# Patient Record
Sex: FEMALE | Race: WHITE | NOT HISPANIC OR LATINO | URBAN - METROPOLITAN AREA
[De-identification: names, ages, dates, MRNs, and addresses within clinical notes are randomized per-mention and may not be internally consistent; named-entity substitution may affect disease eponyms.]

---

## 2018-08-21 ENCOUNTER — HISTORICAL (OUTPATIENT)
Dept: ADMINISTRATIVE | Facility: HOSPITAL | Age: 58
End: 2018-08-21

## 2018-08-21 LAB
ABS NEUT (OLG): 5
ALBUMIN SERPL-MCNC: 4.4 GM/DL (ref 3.4–5)
ALBUMIN/GLOB SERPL: 1.47 {RATIO} (ref 1.5–2.5)
ALP SERPL-CCNC: 55 UNIT/L (ref 38–126)
ALT SERPL-CCNC: 54 UNIT/L (ref 7–52)
APPEARANCE, UA: CLEAR
AST SERPL-CCNC: 27 UNIT/L (ref 15–37)
BACTERIA #/AREA URNS AUTO: ABNORMAL /HPF
BILIRUB SERPL-MCNC: 0.3 MG/DL (ref 0.2–1)
BILIRUB UR QL STRIP: NEGATIVE MG/DL
BILIRUBIN DIRECT+TOT PNL SERPL-MCNC: 0.1 MG/DL (ref 0–0.5)
BILIRUBIN DIRECT+TOT PNL SERPL-MCNC: 0.2 MG/DL
BUN SERPL-MCNC: 23 MG/DL (ref 7–18)
CALCIUM SERPL-MCNC: 8.8 MG/DL (ref 8.5–10)
CHLORIDE SERPL-SCNC: 106 MMOL/L (ref 98–107)
CHOLEST SERPL-MCNC: 123 MG/DL (ref 0–200)
CHOLEST/HDLC SERPL: 2.9 {RATIO}
CK SERPL-CCNC: 66 UNIT/L (ref 21–232)
CO2 SERPL-SCNC: 28 MMOL/L (ref 21–32)
COLOR UR: YELLOW
CREAT SERPL-MCNC: 0.83 MG/DL (ref 0.6–1.3)
CREAT UR-MCNC: 300 MG/DL
ERYTHROCYTE [DISTWIDTH] IN BLOOD BY AUTOMATED COUNT: 14.7 % (ref 11.5–17)
EST. AVERAGE GLUCOSE BLD GHB EST-MCNC: 131 MG/DL
GLOBULIN SER-MCNC: 3 GM/DL (ref 1.2–3)
GLUCOSE (UA): NEGATIVE MG/DL
GLUCOSE SERPL-MCNC: 122 MG/DL (ref 74–106)
HBA1C MFR BLD: 6.2 % (ref 4.4–6.4)
HCT VFR BLD AUTO: 38.2 % (ref 37–47)
HDLC SERPL-MCNC: 42 MG/DL (ref 35–60)
HGB BLD-MCNC: 12.4 GM/DL (ref 12–16)
HGB UR QL STRIP: NEGATIVE UNIT/L
KETONES UR QL STRIP: NEGATIVE MG/DL
LDLC SERPL CALC-MCNC: 64 MG/DL (ref 0–129)
LEUKOCYTE ESTERASE UR QL STRIP: NEGATIVE UNIT/L
LYMPHOCYTES # BLD AUTO: 2 X10(3)/MCL (ref 0.6–3.4)
LYMPHOCYTES NFR BLD AUTO: 25.9 % (ref 13–40)
MCH RBC QN AUTO: 32.1 PG (ref 27–31.2)
MCHC RBC AUTO-ENTMCNC: 32 GM/DL (ref 32–36)
MCV RBC AUTO: 99 FL (ref 80–94)
MICROALBUMIN UR-MCNC: 30 MG/L
MICROALBUMIN/CREAT RATIO PNL UR: <30 MG/GM
MONOCYTES # BLD AUTO: 0.7 X10(3)/MCL (ref 0–1.8)
MONOCYTES NFR BLD AUTO: 8.6 % (ref 0.1–24)
NEUTROPHILS NFR BLD AUTO: 65.5 % (ref 47–80)
NITRITE UR QL STRIP.AUTO: NEGATIVE
PH UR STRIP: 5.5 [PH]
PLATELET # BLD AUTO: 408 X10(3)/MCL (ref 130–400)
PMV BLD AUTO: 8.3 FL
POTASSIUM SERPL-SCNC: 4.6 MMOL/L (ref 3.5–5.1)
PROT SERPL-MCNC: 7.4 GM/DL (ref 6.4–8.2)
PROT UR QL STRIP: NEGATIVE MG/DL
RBC # BLD AUTO: 3.86 X10(6)/MCL (ref 4.2–5.4)
RBC #/AREA URNS HPF: ABNORMAL /HPF
SODIUM SERPL-SCNC: 139 MMOL/L (ref 136–145)
SP GR UR STRIP: >1.03
SQUAMOUS EPITHELIAL, UA: ABNORMAL /LPF
TRIGL SERPL-MCNC: 114 MG/DL (ref 30–150)
TSH SERPL-ACNC: 1.97 MIU/ML (ref 0.35–4.94)
UROBILINOGEN UR STRIP-ACNC: 0.2 MG/DL
VLDLC SERPL CALC-MCNC: 22.8 MG/DL
WBC # SPEC AUTO: 7.7 X10(3)/MCL (ref 4.5–11.5)
WBC #/AREA URNS AUTO: ABNORMAL /[HPF]

## 2019-03-12 ENCOUNTER — HISTORICAL (OUTPATIENT)
Dept: ADMINISTRATIVE | Facility: HOSPITAL | Age: 59
End: 2019-03-12

## 2019-03-12 LAB
APPEARANCE, UA: CLEAR
BACTERIA #/AREA URNS AUTO: NORMAL /HPF
BILIRUB UR QL STRIP: NEGATIVE MG/DL
BUN SERPL-MCNC: 19 MG/DL (ref 7–18)
CALCIUM SERPL-MCNC: 9.2 MG/DL (ref 8.5–10)
CHLORIDE SERPL-SCNC: 104 MMOL/L (ref 98–107)
CO2 SERPL-SCNC: 28 MMOL/L (ref 21–32)
COLOR UR: YELLOW
CREAT SERPL-MCNC: 0.95 MG/DL (ref 0.6–1.3)
CREAT/UREA NIT SERPL: 20
EST. AVERAGE GLUCOSE BLD GHB EST-MCNC: 126 MG/DL
GLUCOSE (UA): NEGATIVE MG/DL
GLUCOSE SERPL-MCNC: 98 MG/DL (ref 74–106)
HBA1C MFR BLD: 6 % (ref 4.4–6.4)
HGB UR QL STRIP: NEGATIVE UNIT/L
KETONES UR QL STRIP: NEGATIVE MG/DL
LEUKOCYTE ESTERASE UR QL STRIP: NEGATIVE UNIT/L
NITRITE UR QL STRIP.AUTO: NEGATIVE
PH UR STRIP: 5 [PH]
POTASSIUM SERPL-SCNC: 4.2 MMOL/L (ref 3.5–5.1)
PROT UR QL STRIP: NEGATIVE MG/DL
RBC #/AREA URNS HPF: NORMAL /HPF
SODIUM SERPL-SCNC: 139 MMOL/L (ref 136–145)
SP GR UR STRIP: 1.01
SQUAMOUS EPITHELIAL, UA: NORMAL /LPF
UROBILINOGEN UR STRIP-ACNC: 0.2 MG/DL
WBC #/AREA URNS AUTO: NORMAL /[HPF]

## 2019-09-11 ENCOUNTER — HISTORICAL (OUTPATIENT)
Dept: ADMINISTRATIVE | Facility: HOSPITAL | Age: 59
End: 2019-09-11

## 2019-09-11 LAB
ABS NEUT (OLG): 7.3 X10(3)/MCL (ref 2.1–9.2)
ALBUMIN SERPL-MCNC: 4.5 GM/DL (ref 3.4–5)
ALBUMIN/GLOB SERPL: 2.05 {RATIO} (ref 1.5–2.5)
ALP SERPL-CCNC: 46 UNIT/L (ref 38–126)
ALT SERPL-CCNC: 29 UNIT/L (ref 7–52)
APPEARANCE, UA: CLEAR
AST SERPL-CCNC: 18 UNIT/L (ref 15–37)
BACTERIA #/AREA URNS AUTO: ABNORMAL /HPF
BILIRUB SERPL-MCNC: 0.3 MG/DL (ref 0.2–1)
BILIRUB UR QL STRIP: NEGATIVE MG/DL
BILIRUBIN DIRECT+TOT PNL SERPL-MCNC: 0 MG/DL (ref 0–0.5)
BILIRUBIN DIRECT+TOT PNL SERPL-MCNC: 0.3 MG/DL
BUN SERPL-MCNC: 26 MG/DL (ref 7–18)
CALCIUM SERPL-MCNC: 9.2 MG/DL (ref 8.5–10)
CHLORIDE SERPL-SCNC: 103 MMOL/L (ref 98–107)
CHOLEST SERPL-MCNC: 164 MG/DL (ref 0–200)
CHOLEST/HDLC SERPL: 3.5 {RATIO}
CK SERPL-CCNC: 51 UNIT/L (ref 21–232)
CO2 SERPL-SCNC: 29 MMOL/L (ref 21–32)
COLOR UR: YELLOW
CREAT SERPL-MCNC: 0.98 MG/DL (ref 0.6–1.3)
CREAT UR-MCNC: 300 MG/DL
ERYTHROCYTE [DISTWIDTH] IN BLOOD BY AUTOMATED COUNT: 14.8 % (ref 11.5–17)
EST. AVERAGE GLUCOSE BLD GHB EST-MCNC: 123 MG/DL
GLOBULIN SER-MCNC: 2.2 GM/DL (ref 1.2–3)
GLUCOSE (UA): NEGATIVE MG/DL
GLUCOSE SERPL-MCNC: 111 MG/DL (ref 74–106)
HBA1C MFR BLD: 5.9 % (ref 4.4–6.4)
HCT VFR BLD AUTO: 39.1 % (ref 37–47)
HDLC SERPL-MCNC: 47 MG/DL (ref 35–60)
HGB BLD-MCNC: 12.8 GM/DL (ref 12–16)
HGB UR QL STRIP: NEGATIVE UNIT/L
KETONES UR QL STRIP: NEGATIVE MG/DL
LDLC SERPL CALC-MCNC: 87 MG/DL (ref 0–129)
LEUKOCYTE ESTERASE UR QL STRIP: ABNORMAL UNIT/L
LYMPHOCYTES # BLD AUTO: 3.6 X10(3)/MCL (ref 0.6–3.4)
LYMPHOCYTES NFR BLD AUTO: 31.3 % (ref 13–40)
MCH RBC QN AUTO: 31.2 PG (ref 27–31.2)
MCHC RBC AUTO-ENTMCNC: 33 GM/DL (ref 32–36)
MCV RBC AUTO: 95 FL (ref 80–94)
MICROALBUMIN UR-MCNC: 80 MG/L
MICROALBUMIN/CREAT RATIO PNL UR: <30 MG/GM
MONOCYTES # BLD AUTO: 0.7 X10(3)/MCL (ref 0.1–1.3)
MONOCYTES NFR BLD AUTO: 6.4 % (ref 0.1–24)
NEUTROPHILS NFR BLD AUTO: 62.3 % (ref 47–80)
NITRITE UR QL STRIP.AUTO: NEGATIVE
PH UR STRIP: 6 [PH]
PLATELET # BLD AUTO: 471 X10(3)/MCL (ref 130–400)
PMV BLD AUTO: 8.2 FL (ref 9.4–12.4)
POTASSIUM SERPL-SCNC: 4.6 MMOL/L (ref 3.5–5.1)
PROT SERPL-MCNC: 6.7 GM/DL (ref 6.4–8.2)
PROT UR QL STRIP: NEGATIVE MG/DL
RBC # BLD AUTO: 4.1 X10(6)/MCL (ref 4.2–5.4)
RBC #/AREA URNS HPF: ABNORMAL /HPF
SODIUM SERPL-SCNC: 140 MMOL/L (ref 136–145)
SP GR UR STRIP: 1.02
SQUAMOUS EPITHELIAL, UA: ABNORMAL /LPF
TRIGL SERPL-MCNC: 199 MG/DL (ref 30–150)
TSH SERPL-ACNC: 2.34 MIU/ML (ref 0.35–4.94)
UROBILINOGEN UR STRIP-ACNC: 0.2 MG/DL
VLDLC SERPL CALC-MCNC: 39.8 MG/DL
WBC # SPEC AUTO: 11.6 X10(3)/MCL (ref 4.5–11.5)
WBC #/AREA URNS AUTO: ABNORMAL /[HPF]

## 2020-03-10 ENCOUNTER — HISTORICAL (OUTPATIENT)
Dept: ADMINISTRATIVE | Facility: HOSPITAL | Age: 60
End: 2020-03-10

## 2020-03-10 LAB
ALBUMIN SERPL-MCNC: 4.8 GM/DL (ref 3.4–5)
ALBUMIN/GLOB SERPL: 2.09 {RATIO} (ref 1.5–2.5)
ALP SERPL-CCNC: 54 UNIT/L (ref 38–126)
ALT SERPL-CCNC: 34 UNIT/L (ref 7–52)
AST SERPL-CCNC: 24 UNIT/L (ref 15–37)
BILIRUB SERPL-MCNC: 0.3 MG/DL (ref 0.2–1)
BILIRUBIN DIRECT+TOT PNL SERPL-MCNC: 0.1 MG/DL (ref 0–0.5)
BILIRUBIN DIRECT+TOT PNL SERPL-MCNC: 0.2 MG/DL
BUN SERPL-MCNC: 17 MG/DL (ref 7–18)
CALCIUM SERPL-MCNC: 9.4 MG/DL (ref 8.5–10)
CHLORIDE SERPL-SCNC: 102 MMOL/L (ref 98–107)
CO2 SERPL-SCNC: 31 MMOL/L (ref 21–32)
CREAT SERPL-MCNC: 0.85 MG/DL (ref 0.6–1.3)
EST. AVERAGE GLUCOSE BLD GHB EST-MCNC: 126 MG/DL
GLOBULIN SER-MCNC: 2.3 GM/DL (ref 1.2–3)
GLUCOSE SERPL-MCNC: 111 MG/DL (ref 74–106)
HBA1C MFR BLD: 6 % (ref 4.4–6.4)
POTASSIUM SERPL-SCNC: 4.6 MMOL/L (ref 3.5–5.1)
PROT SERPL-MCNC: 7.1 GM/DL (ref 6.4–8.2)
SODIUM SERPL-SCNC: 139 MMOL/L (ref 136–145)

## 2020-09-17 ENCOUNTER — HISTORICAL (OUTPATIENT)
Dept: ADMINISTRATIVE | Facility: HOSPITAL | Age: 60
End: 2020-09-17

## 2020-09-17 LAB
ABS NEUT (OLG): 5.7 X10(3)/MCL (ref 2.1–9.2)
ALBUMIN SERPL-MCNC: 4.6 GM/DL (ref 3.4–5)
ALBUMIN/GLOB SERPL: 1.92 {RATIO} (ref 1.5–2.5)
ALP SERPL-CCNC: 52 UNIT/L (ref 38–126)
ALT SERPL-CCNC: 24 UNIT/L (ref 7–52)
APPEARANCE, UA: ABNORMAL
AST SERPL-CCNC: 19 UNIT/L (ref 15–37)
BACTERIA #/AREA URNS AUTO: ABNORMAL /HPF
BILIRUB SERPL-MCNC: 0.3 MG/DL (ref 0.2–1)
BILIRUB UR QL STRIP: NEGATIVE MG/DL
BILIRUBIN DIRECT+TOT PNL SERPL-MCNC: 0.1 MG/DL (ref 0–0.5)
BILIRUBIN DIRECT+TOT PNL SERPL-MCNC: 0.2 MG/DL
BUN SERPL-MCNC: 21 MG/DL (ref 7–18)
CALCIUM SERPL-MCNC: 9.4 MG/DL (ref 8.5–10)
CHLORIDE SERPL-SCNC: 105 MMOL/L (ref 98–107)
CHOLEST SERPL-MCNC: 147 MG/DL (ref 0–200)
CHOLEST/HDLC SERPL: 3 {RATIO}
CK SERPL-CCNC: 68 UNIT/L (ref 21–232)
CO2 SERPL-SCNC: 26 MMOL/L (ref 21–32)
COLOR UR: YELLOW
CREAT SERPL-MCNC: 0.8 MG/DL (ref 0.6–1.3)
CREAT UR-MCNC: 50 MG/DL
ERYTHROCYTE [DISTWIDTH] IN BLOOD BY AUTOMATED COUNT: 15.4 % (ref 11.5–17)
EST. AVERAGE GLUCOSE BLD GHB EST-MCNC: 111 MG/DL
GLOBULIN SER-MCNC: 2.4 GM/DL (ref 1.2–3)
GLUCOSE (UA): NEGATIVE MG/DL
GLUCOSE SERPL-MCNC: 105 MG/DL (ref 74–106)
HBA1C MFR BLD: 5.5 % (ref 4.4–6.4)
HCT VFR BLD AUTO: 38.2 % (ref 37–47)
HDLC SERPL-MCNC: 49 MG/DL (ref 35–60)
HGB BLD-MCNC: 12.5 GM/DL (ref 12–16)
HGB UR QL STRIP: NEGATIVE UNIT/L
KETONES UR QL STRIP: NEGATIVE MG/DL
LDLC SERPL CALC-MCNC: 89 MG/DL (ref 0–129)
LEUKOCYTE ESTERASE UR QL STRIP: ABNORMAL UNIT/L
LYMPHOCYTES # BLD AUTO: 1.9 X10(3)/MCL (ref 0.6–3.4)
LYMPHOCYTES NFR BLD AUTO: 22.9 % (ref 13–40)
MCH RBC QN AUTO: 31.6 PG (ref 27–31.2)
MCHC RBC AUTO-ENTMCNC: 33 GM/DL (ref 32–36)
MCV RBC AUTO: 96 FL (ref 80–94)
MICROALBUMIN UR-MCNC: 10 MG/L
MICROALBUMIN/CREAT RATIO PNL UR: <30 MG/GM
MONOCYTES # BLD AUTO: 0.6 X10(3)/MCL (ref 0.1–1.3)
MONOCYTES NFR BLD AUTO: 7.9 % (ref 0.1–24)
NEUTROPHILS NFR BLD AUTO: 69.2 % (ref 47–80)
NITRITE UR QL STRIP.AUTO: NEGATIVE
PH UR STRIP: 5.5 [PH]
PLATELET # BLD AUTO: 482 X10(3)/MCL (ref 130–400)
PMV BLD AUTO: 8.3 FL (ref 9.4–12.4)
POTASSIUM SERPL-SCNC: 4.4 MMOL/L (ref 3.5–5.1)
PROT SERPL-MCNC: 7 GM/DL (ref 6.4–8.2)
PROT UR QL STRIP: NEGATIVE MG/DL
RBC # BLD AUTO: 3.96 X10(6)/MCL (ref 4.2–5.4)
RBC #/AREA URNS HPF: ABNORMAL /HPF
SODIUM SERPL-SCNC: 142 MMOL/L (ref 136–145)
SP GR UR STRIP: 1.02
SQUAMOUS EPITHELIAL, UA: ABNORMAL /LPF
TRIGL SERPL-MCNC: 99 MG/DL (ref 30–150)
TSH SERPL-ACNC: 1.61 MIU/ML (ref 0.35–4.94)
UROBILINOGEN UR STRIP-ACNC: 0.2 MG/DL
VLDLC SERPL CALC-MCNC: 19.8 MG/DL
WBC # SPEC AUTO: 8.2 X10(3)/MCL (ref 4.5–11.5)
WBC #/AREA URNS AUTO: ABNORMAL /[HPF]

## 2021-03-18 ENCOUNTER — HISTORICAL (OUTPATIENT)
Dept: ADMINISTRATIVE | Facility: HOSPITAL | Age: 61
End: 2021-03-18

## 2021-03-18 LAB
BUN SERPL-MCNC: 19 MG/DL (ref 7–18)
CALCIUM SERPL-MCNC: 9.8 MG/DL (ref 8.5–10)
CHLORIDE SERPL-SCNC: 105 MMOL/L (ref 98–107)
CO2 SERPL-SCNC: 27 MMOL/L (ref 21–32)
CREAT SERPL-MCNC: 0.82 MG/DL (ref 0.6–1.3)
CREAT/UREA NIT SERPL: 23.2
EST. AVERAGE GLUCOSE BLD GHB EST-MCNC: 108 MG/DL
GLUCOSE SERPL-MCNC: 111 MG/DL (ref 74–106)
HBA1C MFR BLD: 5.4 % (ref 4.4–6.4)
POTASSIUM SERPL-SCNC: 4.8 MMOL/L (ref 3.5–5.1)
SODIUM SERPL-SCNC: 139 MMOL/L (ref 136–145)

## 2021-06-16 ENCOUNTER — HISTORICAL (OUTPATIENT)
Dept: ADMINISTRATIVE | Facility: HOSPITAL | Age: 61
End: 2021-06-16

## 2021-09-23 ENCOUNTER — HISTORICAL (OUTPATIENT)
Dept: ADMINISTRATIVE | Facility: HOSPITAL | Age: 61
End: 2021-09-23

## 2021-09-23 LAB
ABS NEUT (OLG): 7.5 X10(3)/MCL (ref 2.1–9.2)
ALBUMIN SERPL-MCNC: 4.4 GM/DL (ref 3.4–5)
ALBUMIN/GLOB SERPL: 2 {RATIO} (ref 1.5–2.5)
ALP SERPL-CCNC: 37 UNIT/L (ref 38–126)
ALT SERPL-CCNC: 23 UNIT/L (ref 7–52)
APPEARANCE, UA: CLEAR
AST SERPL-CCNC: 17 UNIT/L (ref 15–37)
BACTERIA #/AREA URNS AUTO: ABNORMAL /HPF
BILIRUB SERPL-MCNC: 0.4 MG/DL (ref 0.2–1)
BILIRUB UR QL STRIP: NEGATIVE MG/DL
BILIRUBIN DIRECT+TOT PNL SERPL-MCNC: 0.1 MG/DL (ref 0–0.5)
BILIRUBIN DIRECT+TOT PNL SERPL-MCNC: 0.3 MG/DL
BUN SERPL-MCNC: 19 MG/DL (ref 7–18)
CALCIUM SERPL-MCNC: 9.2 MG/DL (ref 8.5–10)
CHLORIDE SERPL-SCNC: 103 MMOL/L (ref 98–107)
CHOLEST SERPL-MCNC: 158 MG/DL (ref 0–200)
CHOLEST/HDLC SERPL: 2.7 {RATIO}
CK SERPL-CCNC: 65 UNIT/L (ref 21–232)
CO2 SERPL-SCNC: 28 MMOL/L (ref 21–32)
COLOR UR: YELLOW
CREAT SERPL-MCNC: 0.8 MG/DL (ref 0.6–1.3)
CREAT UR-MCNC: 200 MG/DL
ERYTHROCYTE [DISTWIDTH] IN BLOOD BY AUTOMATED COUNT: 14.9 % (ref 11.5–17)
EST CREAT CLEARANCE SER (OHS): 81.46 ML/MIN
EST. AVERAGE GLUCOSE BLD GHB EST-MCNC: 117 MG/DL
GLOBULIN SER-MCNC: 2.2 GM/DL (ref 1.2–3)
GLUCOSE (UA): NEGATIVE MG/DL
GLUCOSE SERPL-MCNC: 110 MG/DL (ref 74–106)
HBA1C MFR BLD: 5.7 % (ref 4.4–6.4)
HCT VFR BLD AUTO: 36.8 % (ref 37–47)
HDLC SERPL-MCNC: 59 MG/DL (ref 35–60)
HGB BLD-MCNC: 12 GM/DL (ref 12–16)
HGB UR QL STRIP: NEGATIVE UNIT/L
KETONES UR QL STRIP: NEGATIVE MG/DL
LDLC SERPL CALC-MCNC: 78 MG/DL (ref 0–129)
LEUKOCYTE ESTERASE UR QL STRIP: NEGATIVE UNIT/L
LYMPHOCYTES # BLD AUTO: 1.2 X10(3)/MCL (ref 0.6–3.4)
LYMPHOCYTES NFR BLD AUTO: 12.4 % (ref 13–40)
MCH RBC QN AUTO: 32.1 PG (ref 27–31.2)
MCHC RBC AUTO-ENTMCNC: 33 GM/DL (ref 32–36)
MCV RBC AUTO: 98 FL (ref 80–94)
MICROALBUMIN UR-MCNC: 10 MG/L
MICROALBUMIN/CREAT RATIO PNL UR: <30 MG/GM
MONOCYTES # BLD AUTO: 0.7 X10(3)/MCL (ref 0.1–1.3)
MONOCYTES NFR BLD AUTO: 7.4 % (ref 0.1–24)
NEUTROPHILS NFR BLD AUTO: 80.2 % (ref 47–80)
NITRITE UR QL STRIP.AUTO: NEGATIVE
PH UR STRIP: 5.5 [PH]
PLATELET # BLD AUTO: 490 X10(3)/MCL (ref 130–400)
PMV BLD AUTO: 8 FL (ref 9.4–12.4)
POTASSIUM SERPL-SCNC: 4.3 MMOL/L (ref 3.5–5.1)
PROT SERPL-MCNC: 6.6 GM/DL (ref 6.4–8.2)
PROT UR QL STRIP: NEGATIVE MG/DL
RBC # BLD AUTO: 3.74 X10(6)/MCL (ref 4.2–5.4)
RBC #/AREA URNS HPF: ABNORMAL /HPF
SODIUM SERPL-SCNC: 140 MMOL/L (ref 136–145)
SP GR UR STRIP: >1.03
SQUAMOUS EPITHELIAL, UA: ABNORMAL /LPF
TRIGL SERPL-MCNC: 136 MG/DL (ref 30–150)
TSH SERPL-ACNC: 1.13 MIU/ML (ref 0.35–4.94)
UROBILINOGEN UR STRIP-ACNC: 0.2 MG/DL
VLDLC SERPL CALC-MCNC: 27.2 MG/DL
WBC # SPEC AUTO: 9.4 X10(3)/MCL (ref 4.5–11.5)
WBC #/AREA URNS AUTO: ABNORMAL /[HPF]

## 2022-04-10 ENCOUNTER — HISTORICAL (OUTPATIENT)
Dept: ADMINISTRATIVE | Facility: HOSPITAL | Age: 62
End: 2022-04-10

## 2022-04-29 VITALS
OXYGEN SATURATION: 97 % | DIASTOLIC BLOOD PRESSURE: 72 MMHG | HEIGHT: 61 IN | BODY MASS INDEX: 28.72 KG/M2 | SYSTOLIC BLOOD PRESSURE: 122 MMHG | WEIGHT: 151 LBS | SYSTOLIC BLOOD PRESSURE: 116 MMHG | WEIGHT: 152.13 LBS | OXYGEN SATURATION: 97 % | DIASTOLIC BLOOD PRESSURE: 76 MMHG | BODY MASS INDEX: 28.51 KG/M2 | HEIGHT: 61 IN

## 2022-05-02 NOTE — HISTORICAL [facility] CERNER
This is a historical note converted from [employer]. Formatting and pictures may have been removed.  Please reference Pre[employer] for original formatting and attached multimedia. Chief Complaint  wellness  History of Present Illness  The patient is a?59 year old?female here today for a complete Wellness Physical exam. The patient has?no acute complaints today. The patient also carries a diagnosis of mentioned, which is assessed today. Exercise is reported as?moderate and includes?working out at the?gym 3- 5 times a week with weights and?45 min of cardio?Monitors diet on a daily basis. Previous documented weight at last office visit is 166- congratulated on weight loss!?  Also here in clinic for evaluation of T2DM. The patient reports home blood glucose levels of?admits to not monitoring unless she is taking steroids. ?The patient reports no hypoglycemic episodes. The patient reports 100% compliance with medications. The patient reports no side effects with current medication use. The patient reports following a diabetic diet. The patient reports some cardiovascular exercise implementation. There is no chest pain or shortness of breath reported with exercise.?  ?   Dr. Rodriguez- GYN  Dr. Tran (rheumatology)  Dr. Sahni (pulmonary)  Dr. Rivers (ophthalmologist)  Review of Systems  Constitutional:?no weight gain,?weight loss,?no fatigue,?no fever,?no chills,?no weakness,?no trouble sleeping.  Eyes:?no vision loss/changes,?glasses or contacts,?no pain,?no redness,?no blurry or double vision,?no flashing lights,?no specks,?no glaucoma,?no cataracts.  Last eye exam:?within last 6 months  Head:?no headache,?no head injury,?no neck pain.?  Neck:??no lumps,?no swollen glands,?no stiffness.  Ears:?no decreased hearing,?no ringing,?no earache,?no drainage.?  Nose:?no stuffiness,?no discharge,?no itching,?no hay fever,?no nosebleeds,?no sinus pain.  Throat:?no bleeding,?no dentures,?no sore tongue,?no dry mouth,?no sore throat,?no  hoarseness,?no thrush,?no non-healing sores.  Cardiovascular:?no chest pain or discomfort,?no tightness,?no palpitations,?no SOB with activity,?no difficulty breathing while supine,?no swelling,?no sudden awakening from sleep with SOB.  Vascular:?no calf pain with walking,?no leg cramping.  Respiratory:??no cough,?no sputum,?no coughing up blood,?no SOB,?no wheezing,?no painful breathing.  Gastrointestinal:?no swallowing difficulty,?no heartburn,?no change in appetite,?no nausea,?no change in bowel habits,?no rectal bleeding,?no constipation,?no diarrhea,?no yellow eyes or skin.  Urinary:?no frequency,?no urgency,?no burning or pain,?no blood in urine,?no incontinence,?no change in urinary strength.  Musculoskeletal:?no muscle or joint pain,?no stiffness,?no back pain,?no redness of joints,?no swelling of joints,?no trauma.  Skin:?no rashes,?no lumps,?no itching,?no dryness,?color normal for ethnicity,?no hair or nail changes.  Neurologic:?no dizziness,?no fainting,?no seizures,?no weakness,?no numbness,?no tingling,?no tremors.  Psychiatric:?no nervousness,?no stress,?no depression,?no memory loss.  Endocrine:?no heat or cold intolerance,?no sweating,?no frequent urination,?no thirst,?no change in appetite.  Hematologic:?no ease of bruising,?no bleeding  Physical Exam  Vitals & Measurements  BP:?118/78?  HT:?154.00?cm? WT:?66.800?kg? BMI:?28.17?  General- In NAD, A&O x 4  ?   Eye- PERRL, EOMI  ?   HENT- TM/EAC clear, Nose mucosa WNL, No D/C, No Sinus Tenderness, O/P without erythema or exudates?  ?   Neck- S, No LA, No Thyromegaly, No bruits, No JVD  ?   Respiratory- CTA, No wheezing, No crackles, No rhonchi  ?   Cardiovascular- RRR W/O MGR, Pulses equal throughout  ?   Gastrointestinal- S, NT, No HSM, NABS, No masses, No peritoneal signs?  ?   Lymphatics- WNL  ?  Musculoskeletal- No tenderness, Joints WNL, FROM, Neg SLR, No CCE  ?  Integumentary- Warm, dry, intact, No lesions/rashes/hives  ?  Neurologic- No  Motor/Sensory deficits, Reflexes +2 throughout, CN II-XII intact, Neg cerebellar tests  ?  Monofilament-?WNL,+2 pulses,?no lesions noted  ?  Assessment/Plan  1.?Wellness examination?Z00.00  ?1. Wellness  - Health and Exercise educated upon  -10% weight loss goal  -Lifestyle counseling > 20 minutes  -Diet: DM friendly  -Screening: UTD Colon, Mammogram  -Vaccines: UTD Tdap/Prevnar/Shingrix- wishes flu shot today however they have not come in- she will inquire at her pharmacy  -LabS: CBC, CMP, LIPIDS, TSH, UA, MA, A1C, CK- to fax lab work to all providers  ?   2. Comorbidities- mentioned  ?   3. Referrals none at this time  ?   4. RTC in?6 month sooner if needed  Ordered:  Comprehensive Metabolic Panel, Routine collect, 09/17/20 9:24:00 CDT, Blood, Stop date 09/17/20 9:24:00 CDT, Lab Collect, Wellness examination  Diabetes mellitus type 2  Hyperlipidaemia, 09/17/20 9:24:00 CDT  Lipid Panel, Routine collect, 09/17/20 9:24:00 CDT, Blood, Stop date 09/17/20 9:24:00 CDT, Lab Collect, Wellness examination  Hyperlipidaemia, 09/17/20 9:24:00 CDT  Preventative Health Care Est 40-64 years [id number] PC, Wellness examination, [facility]K [facility]B - [facility], 09/17/20 9:18:00 CDT  Request Mammography Results, 09/17/20 10:30:00 CDT, Wellness examination  Thyroid Stimulating Hormone, Routine collect, 09/17/20 9:24:00 CDT, Blood, Stop date 09/17/20 9:24:00 CDT, Lab Collect, Hypothyroid  Wellness examination, 09/17/20 9:24:00 CDT  ?  2.?Diabetes mellitus type 2?E11.9  1. Refill meds x 6 months  2. Continue diet modifications- decrease sugar, carb, starch intake, exercise as tolerated (TLC) for weight loss  3.?Encouraged importance of yearly eye exams  4. Follow up office visit 6 months for Wellness visit  Ordered:  aspirin, 81 mg = 1 tab(s), Oral, Daily, # 30 tab(s), 0 Refill(s), other reason (Rx), 154, cm, Height/Length Dosing, 09/17/20 8:36:00 CDT, 66.8, kg, Weight Dosing, 09/17/20 8:36:00 CDT  metFORMIN, 500 mg = 1 tab(s), Oral, BID, # 180 tab(s),  1 Refill(s), Pharmacy: [id number], 154, cm, Height/Length Dosing, 09/17/20 8:36:00 CDT, 66.8, kg, Weight Dosing, 09/17/20 8:36:00 CDT  Clinic Follow up, *Est. 03/18/21 8:15:00 CDT, Order for future visit, Diabetes mellitus type 2, [facility]  Comprehensive Metabolic Panel, Routine collect, 09/17/20 9:24:00 CDT, Blood, Stop date 09/17/20 9:24:00 CDT, Lab Collect, Wellness examination  Diabetes mellitus type 2  Hyperlipidaemia, 09/17/20 9:24:00 CDT  Request Eye Exam Results, 09/17/20 10:31:00 CDT, Diabetes mellitus type 2  ?  3.?Asthma?J45.909  1.? Chronic and controlled, currently stable and followed by Dr. Sahni  ?  4.?Hypothyroid?E03.9  1. ?Meds x1 year  2. ?Currently?controlled and stable  Ordered:  levothyroxine, See Instructions, TAKE 1 TABLET BY MOUTH ONCE DAILY AND 2 TABLETS ON SUNDAY., # 102 tab(s), 3 Refill(s), Pharmacy: [id number], 154, cm, Height/Length Dosing, 09/17/20 8:36:00 CDT, 66.8, kg, Weight Dosing, 09/17/20 8:36:00 CDT  Thyroid Stimulating Hormone, Routine collect, 09/17/20 9:24:00 CDT, Blood, Stop date 09/17/20 9:24:00 CDT, Lab Collect, Hypothyroid  Wellness examination, 09/17/20 9:24:00 CDT  ?  5.?Hyperlipidaemia?E78.5  1. ?Refill meds x1 year  2. ?Continue diet and exercise as tolerated  Ordered:  aspirin, 81 mg = 1 tab(s), Oral, Daily, # 30 tab(s), 0 Refill(s), other reason (Rx), 154, cm, Height/Length Dosing, 09/17/20 8:36:00 CDT, 66.8, kg, Weight Dosing, 09/17/20 8:36:00 CDT  atorvastatin, 10 mg = 1 tab(s), Oral, qPM, # 90 tab(s), 3 Refill(s), Pharmacy: [id number], 154, cm, Height/Length Dosing, 09/17/20 8:36:00 CDT, 66.8, kg, Weight Dosing, 09/17/20 8:36:00 CDT  Comprehensive Metabolic Panel, Routine collect, 09/17/20 9:24:00 CDT, Blood, Stop date 09/17/20 9:24:00 CDT, Lab Collect, Wellness examination  Diabetes mellitus type 2  Hyperlipidaemia, 09/17/20 9:24:00 CDT  Creatine Kinase, Routine collect, 09/17/20 9:24:00 CDT, Blood, Stop date 09/17/20  9:24:00 CDT, Lab Collect, Hyperlipidaemia, 09/17/20 9:24:00 CDT  Lipid Panel, Routine collect, 09/17/20 9:24:00 CDT, Blood, Stop date 09/17/20 9:24:00 CDT, Lab Collect, Wellness examination  Hyperlipidaemia, 09/17/20 9:24:00 CDT  ?  6.?RA - Rheumatoid arthritis?M06.9  ?1. ?Can stable, followed by Dr. Tran  ?  Orders:  albuterol, 2 puff(s), INH, q4hr, # 1 EA, 5 Refill(s), Pharmacy: [id number], 154, cm, Height/Length Dosing, 09/17/20 8:36:00 CDT, 66.8, kg, Weight Dosing, 09/17/20 8:36:00 CDT  Clinic Follow-up PRN, 09/17/20 9:18:00 CDT, [facility] - [facility], Future Order  Referrals  Clinic Follow up, *Est. 03/18/21 8:15:00 CDT, Order for future visit, Diabetes mellitus type 2, [facility]  Clinic Follow-up PRN, 09/17/20 9:18:00 CDT, [facility] - [facility], Future Order   Problem List/Past Medical History  Ongoing  Asthma  Diabetes mellitus type 2  Hyperlipidaemia  Hypothyroid  Migraine  RA - Rheumatoid arthritis  Historical  Thyroid disease  Procedure/Surgical History  Colonoscopy (2015)  Carpal tunnel release  H/O: hysterectomy  Tonsillectomy   Medications  aspirin 81 mg oral Delayed Release (EC) tablet, 81 mg= 1 tab(s), Oral, Daily  atorvastatin 10 mg oral tablet, 10 mg= 1 tab(s), Oral, qPM, 3 refills  BREO ELLIPTA -25  Co Q-10, 100 mg, Oral, Daily  famotidine 20 mg oral tablet, 20 mg= 1 tab(s), Oral, qPM  FOLIC ACID 1 MG TABS  glucosamine, Oral  levothyroxine 25 mcg (0.025 mg) oral tablet, See Instructions, 3 refills  metformin 500 mg oral tablet, 500 mg= 1 tab(s), Oral, BID, 1 refills  METHOTREXATE SODIUM 2.5 MG TABS, 15 mg= 6 tab(s), Oral, qWeek  Misc Prescription  ProAir HFA 90 mcg/inh inhalation aerosol with adapter, 2 puff(s), INH, q4hr, 5 refills  ZyrTEC, Daily  Allergies  No Known Allergies  Social History  Abuse/Neglect  No, 09/10/2019  Tobacco  Never (less than 100 in lifetime), N/A, 09/10/2019  Never (less than 100 in lifetime), N/A, 03/12/2019  Family History  Arthritis: Father.  Depression.:  Mother.  Hypertension.: Mother.  Immunizations  Vaccine Date Status   zoster vaccine, inactivated 12/28/2018 Given   zoster vaccine, inactivated 08/21/2018 Given   pneumococcal 23-polyvalent vaccine 2017 Recorded   tetanus/diphtheria/pertussis, acel(Tdap) 2017 Recorded   pneumococcal 13-valent conjugate vaccine 2016 Recorded   zoster vaccine live 2014 Recorded   Health Maintenance  Health Maintenance  ???Pending?(in the next year)  ??? ??OverDue  ??? ? ? ?Diabetes Maintenance-Microalbumin due??and every?  ??? ? ? ?Zoster Vaccine due??and every?  ??? ? ? ?Breast Cancer Screening due??03/10/11??and every 2??year(s)  ??? ? ? ?Diabetes Maintenance-Fasting Lipid Profile due??09/11/20??and every 1??year(s)  ??? ??Due?  ??? ? ? ?ADL Screening due??09/17/20??and every 1??year(s)  ??? ? ? ?Asthma Management-Asthma Education due??09/17/20??and every 6??month(s)  ??? ? ? ?Asthma Management-Spirometry due??09/17/20??Variable frequency  ??? ? ? ?Asthma Management-Allison Peak Flow due??09/17/20??Variable frequency  ??? ? ? ?Asthma Management-Written Action Plan due??09/17/20??and every 6??month(s)  ??? ? ? ?Diabetes Maintenance-Eye Exam due??09/17/20??and every?  ??? ? ? ?Influenza Vaccine due??09/17/20??and every?  ??? ??Due In Future?  ??? ? ? ?Obesity Screening not due until??01/01/21??and every 1??year(s)  ??? ? ? ?Alcohol Misuse Screening not due until??01/02/21??and every 1??year(s)  ??? ? ? ?Cervical Cancer Screening not due until??02/19/21??and every 3??year(s)  ??? ? ? ?Diabetes Maintenance-Serum Creatinine not due until??03/10/21??and every 1??year(s)  ???Satisfied?(in the past 1 year)  ??? ??Satisfied?  ??? ? ? ?Alcohol Misuse Screening on??09/17/20.??Satisfied by Christina Anderson NP  ??? ? ? ?Aspirin Therapy for CVD Prevention on??09/17/20.??Satisfied by Christina Anderson NP  ??? ? ? ?Asthma Management-Asthma Medication Prescribed on??09/17/20.??Satisfied by Christina Anderson NP  ??? ? ?  ?Blood Pressure Screening on??09/17/20.??Satisfied by Khadijah Pate MA  ??? ? ? ?Body Mass Index Check on??09/17/20.??Satisfied by Khadijah Pate MA  ??? ? ? ?Depression Screening on??09/17/20.??Satisfied by Christina Anderson NP  ??? ? ? ?Diabetes Maintenance-Eye Exam on??09/17/20.??Satisfied by Christina Anderson NP  ??? ? ? ?Diabetes Screening on??09/17/20.??Satisfied by Des Collazo  ??? ? ? ?Obesity Screening on??09/17/20.??Satisfied by Khadijah Pate MA  ?      The?physician?is present within?the office with the?nurse practitioner.??The?office visit?documentation and management have been?reviewed and agreed upon.? I will continue to follow?this patient along with?the nurse practitioner?for current and future care.

## 2022-05-02 NOTE — HISTORICAL [facility] CERNER
This is a historical note converted from [employer]. Formatting and pictures may have been removed.  Please reference [employer] for original formatting and attached multimedia. Chief Complaint  F/U From Urgent care for sinus issues  History of Present Illness  The patient is a 60-year-old female who presents today for follow-up from recent urgent care evaluation. ?She was diagnosed with sinusitis, she does have a history of asthma, she was swabbed for Covid and it was noted to be negative.? She has been fully vaccinated for COVID-19. ?Been taking doxycycline as directed and tolerating well, she states overall her symptoms of sinusitis have subsided, she states she is no longer blowing out green.? She states overall?she feels fine with exception?of?episodes of coughing,?she states she is having some SOB when this occurs. ?She has been using her albuterol neb treatments along with her Breo inhaler as directed as she does have a history of asthma.? She is requesting refills on her albuterol nebs?as to when she is using is currently .? She denies any chest pain or dizziness  Review of Systems  Constitutional:?no fever, no weakness, no body aches, no headaches, no loss of taste, smell  Eye:?no eye redness, drainage, or pain  ENMT:?sore?throat pain, postnasal drainage, mucus production  Respiratory:?no wheezing,?no shortness of breath  Cardiovascular:?no chest pain  Gastrointestinal:?no nausea, vomiting, or diarrhea. No abdominal pain  Musculoskeletal:?no muscle or joint pain, no joint swelling  Integumentary:?no skin rash or abnormal lesion  ?  Physical Exam  Vitals & Measurements  HR:?95(Peripheral)? BP:?122/76? SpO2:?97%?  HT:?154.00?cm? WT:?68.500?kg? BMI:?28.88?  Ears-TMs and EAC clear  Nose-Mucosa Erythematous,No discharge noted,no sinus tenderness  O/P-No erythemaor exudatesnoted  Neck- S,No LAnoted  CV- RRR W/O MGR, Pulses equal throughout  Respiratory-CTA,No Wheezing,No Crackles,No  Rhonchi  Assessment/Plan  1.?SOB (shortness of breath)?R06.02  1. ?X-ray appears within normal limits, final reading per radiologist  ?  Ordered:  betamethasone, 12 mg, IM, Once-Unscheduled, first dose 06/16/21 15:01:00 CDT  Clinic Follow up, *Est. 06/23/21 3:00:00 CDT, Order for future visit, SOB (shortness of breath), [facility]  Clinic Follow-up PRN, 06/16/21 15:02:00 CDT, [facility] AMB - [facility], Future Order  Office/Outpatient Visit Level 4 Established 39730 PC, SOB (shortness of breath)  Sinusitis  Asthma, [facility] AM[facility] - [facility], 06/16/21 15:02:00 CDT  XR Chest 2 Views, Routine, 06/16/21 14:45:00 CDT, Other (please specify), None, Ambulatory, Rad Type, SOB (shortness of breath), Not Scheduled, [facility], 06/16/21 14:45:00 CDT  ?  2.?Sinusitis?J32.9  1. Continue current Doxycycline as directed  2. Mucinex OTC with an increase H2O take  Ordered:  betamethasone, 12 mg, IM, Once-Unscheduled, first dose 06/16/21 15:01:00 CDT  Office/Outpatient Visit Level 4 Established 52440 PC, SOB (shortness of breath)  Sinusitis  Asthma, [facility] AMB - [facility], 06/16/21 15:02:00 CDT  ?  3.?Asthma?J45.909  1. ?X-ray appears within normal limits, final reading per radiologist  2. ?Continue albuterol neb treatments as discussed along with Breo?and rescue inhaler prn as previously?prescribed  3.? ER precautions for any changes or worsening in symptoms  4. ?Celestone 12 mg IM x1 now  5.? Medrol Dosepak to take as directed?she will start this medication in 3 days if needed, we had an at length discussion in regards to monitoring her blood sugars along with strict diet at this time, she states she is familiar with this due to her history of RA.? She did mention that?prior to her arrival she did check her blood sugar and it was noted at 82, she will continue to monitor and notify the office for any questions or concerns  6. F/U OV in 1 week  Ordered:  betamethasone, 12 mg, IM, Once-Unscheduled, first dose 06/16/21 15:01:00  CDT  Office/Outpatient Visit Level 4 Established 31232 PC, SOB (shortness of breath)  Sinusitis  Asthma, [facility] AM[facility] - [facility], 06/16/21 15:02:00 CDT  ?  Referrals  Clinic Follow up, *Est. 06/23/21 8:15:00 CDT, Order for future visit, SOB (shortness of breath), [facility]  Clinic Follow-up PRN, 06/16/21 15:02:00 CDT, [facility] AMB - [facility], Future Order   Problem List/Past Medical History  Ongoing  Asthma  Diabetes mellitus type 2  Hyperlipidaemia  Hypothyroid  Migraine  RA - Rheumatoid arthritis  Historical  Thyroid disease  Procedure/Surgical History  Colonoscopy (2015)  Carpal tunnel release  H/O: hysterectomy  Tonsillectomy   Medications  Albuterol (Eqv-ProAir HFA) 90 mcg/inh inhalation aerosol, See Instructions  albuterol 2.5 mg/3 mL (0.083%) inhalation solution, 2.5 mg= 3 mL, NEB, q6hr, PRN  atorvastatin 10 mg oral tablet, See Instructions, 3 refills  BREO ELLIPTA -25  Co Q-10, 100 mg, Oral, Daily  doxycycline hyclate 100 mg oral tablet, 100 mg= 1 tab(s), Oral, BID  famotidine 20 mg oral tablet, 20 mg= 1 tab(s), Oral, qPM  FOLIC ACID 1 MG TABS  glucosamine, Oral  levothyroxine 25 mcg (0.025 mg) oral tablet, See Instructions, 3 refills  lisinopril 5 mg oral tablet, 5 mg= 1 tab(s), Oral, Daily, 1 refills  Medrol Dosepak 4 mg oral tablet, 1 packet(s), Oral, As Directed  metformin 500 mg oral tablet, 500 mg= 1 tab(s), Oral, BID, 1 refills  METHOTREXATE SODIUM 2.5 MG TABS, 15 mg= 6 tab(s), Oral, qWeek  Misc Prescription  ZyrTEC, Daily  Allergies  No Known Allergies  Social History  Abuse/Neglect  No, 03/18/2021  No, 09/10/2019  Tobacco  Never (less than 100 in lifetime), N/A, 03/18/2021  Never (less than 100 in lifetime), N/A, 09/10/2019  Never (less than 100 in lifetime), N/A, 03/12/2019  Family History  Arthritis: Father.  Depression.: Mother.  Hypertension.: Mother.  Immunizations  Vaccine Date Status   COVID-19 MRNA, LNP-S, PF- Pfizer 02/26/2021 Recorded   influenza virus vaccine, inactivated 09/17/2020 Recorded    influenza virus vaccine, inactivated 09/26/2019 Recorded   zoster vaccine, inactivated 12/28/2018 Given   influenza virus vaccine, inactivated 09/14/2018 Recorded   zoster vaccine, inactivated 08/21/2018 Given   influenza virus vaccine, inactivated 08/26/2017 Recorded   pneumococcal 23-polyvalent vaccine 2017 Recorded   tetanus/diphtheria/pertussis, acel(Tdap) 2017 Recorded   pneumococcal 13-valent conjugate vaccine 10/31/2016 Recorded   influenza virus vaccine, inactivated 10/31/2016 Recorded   pneumococcal 13-valent conjugate vaccine 2016 Recorded   zoster vaccine live 2014 Recorded   Health Maintenance  Health Maintenance  ???Pending?(in the next year)  ??? ??OverDue  ??? ? ? ?Asthma Management-Asthma Medication Prescribed due??08/21/19??and every 1??year(s)  ??? ? ? ?Influenza Vaccine due??10/01/20??and every 1??day(s)  ??? ? ? ?Cervical Cancer Screening due??02/19/21??and every 3??year(s)  ??? ??Due?  ??? ? ? ?ADL Screening due??06/16/21??and every 1??year(s)  ??? ? ? ?Aspirin Therapy for CVD Prevention due??06/16/21??and every 1??year(s)  ??? ? ? ?Asthma Management-Asthma Education due??06/16/21??and every 6??month(s)  ??? ? ? ?Asthma Management-Spirometry due??06/16/21??Variable frequency  ??? ? ? ?Asthma Management-Allison Peak Flow due??06/16/21??Variable frequency  ??? ? ? ?Asthma Management-Written Action Plan due??06/16/21??and every 6??month(s)  ??? ??Due In Future?  ??? ? ? ?Diabetes Maintenance-Eye Exam not due until??07/13/21??and every 1??year(s)  ??? ? ? ?Depression Screening not due until??09/17/21??and every 1??year(s)  ??? ? ? ?Diabetes Maintenance-Foot Exam not due until??09/17/21??and every 1??year(s)  ??? ? ? ?Diabetes Maintenance-Fasting Lipid Profile not due until??09/17/21??and every 1??year(s)  ??? ? ? ?Obesity Screening not due until??01/01/22??and every 1??year(s)  ??? ? ? ?Alcohol Misuse Screening not due until??01/02/22??and every 1??year(s)  ??? ? ? ?Diabetes Maintenance-HgbA1c not  due until??03/18/22??and every 1??year(s)  ??? ? ? ?Diabetes Maintenance-Serum Creatinine not due until??03/18/22??and every 1??year(s)  ???Satisfied?(in the past 1 year)  ??? ??Satisfied?  ??? ? ? ?Alcohol Misuse Screening on??03/18/21.??Satisfied by Maninder Irene NP Christina  ??? ? ? ?Asthma Management-Asthma Medication Prescribed on??06/16/21.??Satisfied by Maninder Irene NP, Christina  ??? ? ? ?Blood Pressure Screening on??06/16/21.??Satisfied by Maninder Irene NP Christina  ??? ? ? ?Body Mass Index Check on??06/16/21.??Satisfied by Екатерина Brannon  ??? ? ? ?Breast Cancer Screening on??07/02/20.??Satisfied by Khadijah Pate MA  ??? ? ? ?Depression Screening on??09/17/20.??Satisfied by Maninder Irene NP, Christina  ??? ? ? ?Diabetes Maintenance-Serum Creatinine on??03/18/21.??Satisfied by Des Collazo  ??? ? ? ?Diabetes Screening on??03/18/21.??Satisfied by Des Collazo  ??? ? ? ?Influenza Vaccine on??03/18/21.??Satisfied by Maninder Irene NP, Christina??Reason: Expectation Satisfied Elsewhere  ??? ? ? ?Lipid Screening on??09/17/20.??Satisfied by Sherrie Zabala  ??? ? ? ?Obesity Screening on??06/16/21.??Satisfied by Екатерина Brannon  ?      The?office visit?documentation and management have been?reviewed and agreed upon.? I will continue to follow?this patient along with?the nurse practitioner?for current and future care.

## 2022-05-02 NOTE — HISTORICAL [facility] CERNER
This is a historical note converted from [employer]. Formatting and pictures may have been removed.  Please reference [employer] for original formatting and attached multimedia. Chief Complaint  6 mth dm ov  History of Present Illness  The patient is a?60 year old female. Here in clinic for evaluation of T2DM. The patient reports home blood glucose levels of admits to only monitoring them when she is taking prednisone and has not done so since January. The patient reports no hypoglycemic episodes. The patient reports 100% compliance with medications. The patient reports no side effects with current medication use. The patient reports following a diabetic diet. The patient reports?some cardiovascular exercise implementation at present however has joined a gym but has not been due to covid, she takes her second vaccine next week and plans to return 2 weeks post. There is no chest pain or shortness of breath reported with activity.?UTD with eye exam . She also has a history of hypothyroidism and hyperlipidemia in which she is also monitored for, she is continuing to monitor her diet of fried/greasy foods.  Review of Systems  Constitutional:?no weight gain,?no weight loss,?no fatigue,?no fever,?no chills,?no weakness,?no trouble sleeping.  Eyes:?no vision loss/changes,?glasses or contacts,?no pain,?no redness,?no blurry or double vision,?no flashing lights,?no specks,?no glaucoma,?no cataracts.  Last eye exam:?within last year  Cardiovascular:?no chest pain or discomfort,?no tightness,?no palpitations,?no SOB with activity,?no difficulty breathing while supine,?no swelling,?no sudden awakening from sleep with SOB.  Respiratory:??no cough,?no sputum,?no coughing up blood,?no SOB,?no wheezing,?no painful breathing.  Neurologic:?no dizziness,?no fainting,?no seizures,?no weakness,?no numbness,?no tingling,?no tremors.  Physical Exam  Vitals & Measurements  BP:?130/78?  HT:?154?cm? WT:?67.9?kg?  Cardiovascular- RRR W/O MGR, Pulses  equal throughout  ?   Respiratory- CTA- No wheezing, No crackles, No rhonchi  ?   Monofilament-?WNL,+2 pulses,?no lesionsnoted  Assessment/Plan  1.?Diabetes mellitus type 2?E11.9  1. Refill meds x 6 months  2. Continue diet modifications- decrease sugar, carb, starch intake, exercise as tolerated (TLC) for weight loss  3.?Encouraged importance of yearly eye exams  4. Follow up office visit 6 months for Wellness visit  5. We will also add Lisinopril 2.5mg po qd as this has not been inititated  Ordered:  aspirin, 81 mg = 1 tab(s), Oral, Daily, # 30 tab(s), 0 Refill(s), other reason (Rx), 154, cm, Height/Length Dosing, 03/18/21 7:58:00 CDT, 67.9, kg, Weight Dosing, 03/18/21 7:58:00 CDT  metFORMIN, 500 mg = 1 tab(s), Oral, BID, # 180 tab(s), 1 Refill(s), Pharmacy: [id number], 154, cm, Height/Length Dosing, 03/18/21 7:58:00 CDT, 67.9, kg, Weight Dosing, 03/18/21 7:58:00 CDT  Basic Metabolic Panel, Routine collect, 03/18/21 8:21:00 CDT, Blood, Stop date 03/18/21 8:21:00 CDT, Lab Collect, Diabetes mellitus type 2, 03/18/21 8:21:00 CDT  Clinic Follow up, *Est. 09/23/21 8:15:00 CDT, Order for future visit, Hyperlipidaemia, [facility]  Clinic Follow-up PRN, 03/18/21 8:20:00 CDT, [facility], Future Order  Hemoglobin A1c, Routine collect, 03/18/21 8:21:00 CDT, Blood, Stop date 03/18/21 8:21:00 CDT, Lab Collect, Diabetes mellitus type 2, 03/18/21 8:21:00 CDT  Office/Outpatient Visit Level 4 Established [id number] PC, Diabetes mellitus type 2  Hyperlipidaemia  Hypothyroid, [facility], 03/18/21 8:19:00 CDT  ?  2.?Hypothyroid?E03.9  ?1. Currently controlled, continue current meds  Ordered:  Clinic Follow up, *Est. 09/23/21 8:15:00 CDT, Order for future visit, Hyperlipidaemia, [facility]  Office/Outpatient Visit Level 4 Established [id number] PC, Diabetes mellitus type 2  Hyperlipidaemia  Hypothyroid, [facility], 03/18/21 8:19:00 CDT  ?  3.?Hyperlipidaemia?E78.5  1. Continue diet modification and exercise as  tolerated (TLC)  Ordered:  aspirin, 81 mg = 1 tab(s), Oral, Daily, # 30 tab(s), 0 Refill(s), other reason (Rx), 154, cm, Height/Length Dosing, 03/18/21 7:58:00 CDT, 67.9, kg, Weight Dosing, 03/18/21 7:58:00 CDT  Clinic Follow up, *Est. 09/23/21 8:15:00 CDT, Order for future visit, Hyperlipidaemia, [facility]  Office/Outpatient Visit Level 4 Established [id number] PC, Diabetes mellitus type 2  Hyperlipidaemia  Hypothyroid, [facility], 03/18/21 8:19:00 CDT  ?  Orders:  lisinopril, 5 mg = 1 tab(s), Oral, Daily, # 90 tab(s), 1 Refill(s), Pharmacy: [id number], 154, cm, Height/Length Dosing, 03/18/21 7:58:00 CDT, 67.9, kg, Weight Dosing, 03/18/21 7:58:00 CDT  Referrals  Clinic Follow up, *Est. 09/23/21 8:15:00 CDT, Order for future visit, Hyperlipidaemia, [facility]  Clinic Follow-up PRN, 03/18/21 8:20:00 CDT, [facility] - [facility], Future Order   Problem List/Past Medical History  Ongoing  Asthma  Diabetes mellitus type 2  Hyperlipidaemia  Hypothyroid  Migraine  RA - Rheumatoid arthritis  Historical  Thyroid disease  Procedure/Surgical History  Colonoscopy (2015)  Carpal tunnel release  H/O: hysterectomy  Tonsillectomy   Medications  aspirin 81 mg oral Delayed Release (EC) tablet, 81 mg= 1 tab(s), Oral, Daily  atorvastatin 10 mg oral tablet, See Instructions, 3 refills  BREO ELLIPTA -25  Co Q-10, 100 mg, Oral, Daily  famotidine 20 mg oral tablet, 20 mg= 1 tab(s), Oral, qPM  FOLIC ACID 1 MG TABS  glucosamine, Oral  levothyroxine 25 mcg (0.025 mg) oral tablet, See Instructions, 3 refills  lisinopril 5 mg oral tablet, 5 mg= 1 tab(s), Oral, Daily, 1 refills  metformin 500 mg oral tablet, 500 mg= 1 tab(s), Oral, BID, 1 refills  METHOTREXATE SODIUM 2.5 MG TABS, 15 mg= 6 tab(s), Oral, qWeek  Misc Prescription  ProAir HFA 90 mcg/inh inhalation aerosol with adapter, 2 puff(s), INH, q4hr, 5 refills  ZyrTEC, Daily  Allergies  No Known Allergies  Social History  Abuse/Neglect  No, 03/18/2021  No,  09/10/2019  Tobacco  Never (less than 100 in lifetime), N/A, 03/18/2021  Never (less than 100 in lifetime), N/A, 09/10/2019  Never (less than 100 in lifetime), N/A, 03/12/2019  Family History  Arthritis: Father.  Depression.: Mother.  Hypertension.: Mother.  Immunizations  Vaccine Date Status   COVID-19 MRNA, LNP-S, PF- Pfizer 02/26/2021 Recorded   influenza virus vaccine, inactivated 09/17/2020 Recorded   influenza virus vaccine, inactivated 09/26/2019 Recorded   zoster vaccine, inactivated 12/28/2018 Given   influenza virus vaccine, inactivated 09/14/2018 Recorded   zoster vaccine, inactivated 08/21/2018 Given   influenza virus vaccine, inactivated 08/26/2017 Recorded   pneumococcal 23-polyvalent vaccine 2017 Recorded   tetanus/diphtheria/pertussis, acel(Tdap) 2017 Recorded   pneumococcal 13-valent conjugate vaccine 10/31/2016 Recorded   influenza virus vaccine, inactivated 10/31/2016 Recorded   pneumococcal 13-valent conjugate vaccine 2016 Recorded   zoster vaccine live 2014 Recorded   Health Maintenance  Health Maintenance  ???Pending?(in the next year)  ??? ??OverDue  ??? ? ? ?Obesity Screening due??01/01/21??and every 1??year(s)  ??? ??Due?  ??? ? ? ?ADL Screening due??03/18/21??and every 1??year(s)  ??? ? ? ?Asthma Management-Asthma Education due??03/18/21??and every 6??month(s)  ??? ? ? ?Asthma Management-Spirometry due??03/18/21??Variable frequency  ??? ? ? ?Asthma Management-Allison Peak Flow due??03/18/21??Variable frequency  ??? ? ? ?Asthma Management-Written Action Plan due??03/18/21??and every 6??month(s)  ??? ??Due In Future?  ??? ? ? ?Diabetes Maintenance-Fasting Lipid Profile not due until??09/17/21??and every 1??year(s)  ??? ? ? ?Diabetes Maintenance-Serum Creatinine not due until??09/17/21??and every 1??year(s)  ??? ? ? ?Alcohol Misuse Screening not due until??01/02/22??and every 1??year(s)  ???Satisfied?(in the past 1 year)  ??? ??Satisfied?  ??? ? ? ?Alcohol Misuse Screening  on??03/18/21.??Satisfied by Christina Anderson NP  ??? ? ? ?Aspirin Therapy for CVD Prevention on??03/18/21.??Satisfied by Christina Anderson NP  ??? ? ? ?Diabetes Maintenance-Fasting Lipid Profile on??09/17/20.??Satisfied by Sherrie Zabala  ??? ? ? ?Obesity Screening on??09/17/20.??Satisfied by Khadijah Pate MA  ?      The?physician?is present within?the office with the?nurse practitioner.??The?office visit?documentation and management have been?reviewed and agreed upon.? I will continue to follow?this patient along with?the nurse practitioner?for current and future care.

## 2022-05-02 NOTE — HISTORICAL [facility] CERNER
This is a historical note converted from [employer]. Formatting and pictures may have been removed.  Please reference [employer] for original formatting and attached multimedia. Chief Complaint  WELLNESS  History of Present Illness  The patient is a 57 year old white female here today for a complete Wellness physical.? The patient?has?no acute complaints today. The patient also carries a diagnosis of?DM/HLD/hypothyroid/asthma, which is assessed today.? Exercise is reported as minimal and includes?occupational activities.?? Diet modifications are reported as?diabetic/hyperlipidemic.?? Previous documented weight is recorded? as charted.  She is also?here in clinic for evaluation of Diabetes Mellitus type II.? The patient reports home blood glucose levels of 110-115.? The patient reports no hypoglycemic episodes. The patient reports 100% compliance with medication.? The patient reports no side effects with medication use.? The patient reports following a diabetic diet.? The patient reports some cardiovascular exercise implementation.? There is no chest pain or shortness of breath reported with exercise.  She also reports 100% compliance with her?hypothyroid medication with no side effects. ?She also reports 100% compliance with her?statin medication?with no side effects/she is also?following a hyperlipidemic diet.? She reports?following with her rheumatologist?and having a recent decrease in her methotrexate secondary to increased LFTs. ?Rheumatoid arthritis is controlled?at this time.? She reports being placed on a?oral daily?steroid by pulmonology secondary to increased frequency of symptoms and she carries now a?level of mild persistent?for her asthma.  Review of Systems  Constitutional:?no weight gain,?no weight loss,?no fatigue,?no fever,?no chills,?no weakness,?no trouble sleeping.  Eyes:?no vision loss/changes,?no glasses or contacts,?no pain,?no redness,?no blurry or double vision,?no flashing lights,?no specks,?no  glaucoma,?no cataracts.  Last eye exam:?within last 6 months  Head:?no headache,?no head injury,?no neck pain.?  Neck:??no lumps,?no swollen glands,?no stiffness.  Ears:?no decreased hearing,?no ringing,?no earache,?no drainage.?  Nose:?no stuffiness,?no discharge,?no itching,?no hay fever,?no nosebleeds,?no sinus pain.  Throat:?no bleeding,?no dentures,?no sore tongue,?no dry mouth,?no sore throat,?no hoarseness,?no thrush,?no non-healing sores.  Cardiovascular:?no chest pain or discomfort,?no tightness,?no palpitations,?no SOB with activity,?no difficulty breathing while supine,?no swelling,?no sudden awakening from sleep with SOB.  Vascular:?no calf pain with walking,?no leg cramping.  Respiratory:??no cough,?no sputum,?no coughing up blood,?no SOB,?no wheezing,?no painful breathing.  Gastrointestinal:?no swallowing difficulty,?no heartburn,?no change in appetite,?no nausea,?no change in bowel habits,?no rectal bleeding,?no constipation,?no diarrhea,?no yellow eyes or skin.  Urinary:?no frequency,?no urgency,?no burning or pain,?no blood in urine,?no incontinence,?no change in urinary strength.  Musculoskeletal:?no muscle or joint pain,?no stiffness,?no back pain,?no redness of joints,?no swelling of joints,?no trauma.  Skin:?no rashes,?no lumps,?no itching,?no dryness,?color normal for ethnicity,?no hair or nail changes.  Neurologic:?no dizziness,?no fainting,?no seizures,?no weakness,?no numbness,?no tingling,?no tremors.  Psychiatric:?no nervousness,?no stress,?no depression,?no memory loss.  Endocrine:?no heat or cold intolerance,?no sweating,?no frequent urination,?no thirst,?no change in appetite.  Hematologic:?no ease of bruising,?no ease of bleeding.  ?  ?  ?  ?  Physical Exam  Vitals & Measurements  BP:?132/80?  HT:?154.98?cm? HT:?154.98?cm? WT:?79.8?kg? WT:?79.8?kg? BMI:?33.22?  VITAL SIGNS:? Reviewed.? ?  GENERAL:? In?no apparent distress.? Alert and Oriented x3  HEAD:?No signsof head trauma.  Normocephalic  EYES:? Pupils?equal/round/reactive.? Extraocular motionsintact.  EARS:? Hearing?grossly intact. TMs and EAC?clear  MOUTH:??Oropharynx is clear.?No erythema. No exudates  NECK:? No LAD. No JVD. No thyromegaly. No bruits  CHEST:? Chest with clear breath sounds bilaterally.? No wheezes, rales, or rhonchi. Good air movement  CARDIAC:? Regular rate and rhythm.? S1 and S2, without murmurs, gallops, or rubs.  VASCULAR:??No Edema.? Peripheral pulses normal and equal in all extremities.  ABDOMEN:?Soft, without detectable tenderness.??No sign of distention.?No rebound or guarding, and no masses palpated.? ?Bowel Sounds present and normal x 4.  MUSCULOSKELETAL:??Good range of motion of all major joints.?5/5 strength throughout. Extremities without clubbing, cyanosis or edema.  NEUROLOGIC EXAM:? Alert and oriented x 3.? No focal sensory or strength deficits.? ?Speech normal.? Follows commands.  PSYCHIATRIC:? Mood normal.  SKIN:??No rash or lesions.  Assessment/Plan  1.?Wellness examination  ?Assessment/Plan:  ?   1.?Wellness  -Health and Exercise Prescription issued and educated upon  -10% weight loss goal  -Lifestyle counseling >20minutes  -Diet:?Diabetic/hyperlipidemic  -Screening:?Colonoscopy up-to-date/GYN?breast/Pap/bncfi-ge-zy-date with GYN provider  -Vaccines:?Shing Grix vaccine #1 today and #2 in 2-6 months. ?Otherwise up-to-date  -Labs:?See below  ?   2.Comorbidities:?See below  ?   3. Referrals:?None  ?   4. RTC:?6 month diabetic?visit  ?  Ordered:  zoster vaccine, inactivated, 0.5 mL, IM, Once-NOW, first dose 08/21/18 8:10:00 CDT, stop date 08/21/18 8:10:00 CDT  CBC w/ Auto Diff, Routine collect, 08/21/18 8:10:00 CDT, Blood, Order for future visit, Stop date 08/21/18 8:10:00 CDT, Lab Collect, Wellness examination, 08/21/18 8:10:00 CDT  Comprehensive Metabolic Panel, Routine collect, 08/21/18 8:10:00 CDT, Blood, Order for future visit, Stop date 08/21/18 8:10:00 CDT, Lab Collect, Wellness examination,  08/21/18 8:10:00 CDT  Creatine Kinase, Routine collect, 08/21/18 8:10:00 CDT, Blood, Order for future visit, Stop date 08/21/18 8:10:00 CDT, Lab Collect, Wellness examination, 08/21/18 8:10:00 CDT  Lab Collection Request, 08/21/18 8:10:00 CDT, [facility] - [facility], 08/21/18 8:10:00 CDT  Lipid Panel, Routine collect, *Est. 08/21/18 3:00:00 CDT, Blood, Order for future visit, *Est. Stop date 08/21/18 3:00:00 CDT, Lab Collect, Wellness examination, 08/21/18 8:10:00 CDT  Preventative Health Care Est 40-64 years [id number] PC, Wellness examination  Diabetes mellitus type 2, [facility] - [facility], 08/21/18 8:10:00 CDT  Thyroid Stimulating Hormone, Routine collect, 08/21/18 8:10:00 CDT, Blood, Order for future visit, Stop date 08/21/18 8:10:00 CDT, Lab Collect, Wellness examination, 08/21/18 8:10:00 CDT  Urinalysis Complete no reflex, Routine collect, Urine, Order for future visit, 08/21/18 8:10:00 CDT, Stop date 08/21/18 8:10:00 CDT, Nurse collect, Wellness examination  ?  2.?Diabetes mellitus type 2  ?  Diabetes Mellitus: At goal per diabetic criteria/check hemoglobin A1c today. ?No change to medication regiment. ?Six-month prescription called out-see below  1. ?Advocate 100% compliance?with medication regimen?and?diabetic diet  2. ?Advocate?increased?cardiovascular exercise as tolerated and educated upon  3.? 10% weight loss goal  4.? Monitor blood glucose daily?and keep the appropriate log for evaluation at upcoming office visits  5. ?Follow-up in 6 months for?DM visit  6. ?Future Lab:?DM labs?today and in 6 months  ?  Ordered:  aspirin, 81 mg = 1 tab(s), Oral, Daily, # 30 tab(s), 0 Refill(s), other reason (Rx)  metFORMIN, 500 mg = 1 tab(s), Oral, BID, # 60 tab(s), 5 Refill(s), Pharmacy: [id number]  Clinic Follow up, *Est. 02/21/19 3:00:00 CST, DM, Order for future visit, Diabetes mellitus type 2, [facility]  Hemoglobin A1c, Routine collect, 08/21/18 8:10:00 CDT, Blood, Order for future visit, Stop date 08/21/18 8:10:00  CDT, Lab Collect, Diabetes mellitus type 2, 08/21/18 8:10:00 CDT  Lab Collection Request, 08/21/18 8:10:00 CDT, [facility], 08/21/18 8:10:00 CDT  Microalbumin Urine, Routine collect, Urine, Order for future visit, 08/21/18 8:10:00 CDT, Stop date 08/21/18 8:10:00 CDT, Nurse collect, Diabetes mellitus type 2  [facility] Health Care Est 40-64 years [id number] PC, Wellness examination  Diabetes mellitus type 2, [facility], 08/21/18 8:10:00 CDT  ?  3.?Hyperlipidaemia  ?-restart aspirin?81 mg enteric-coated  -Continue atorvastatin 10 mg no change to medication 12 month prescription given  -Hyperlipidemic diet issued  Ordered:  aspirin, 81 mg = 1 tab(s), Oral, Daily, # 30 tab(s), 0 Refill(s), other reason (Rx)  atorvastatin, 10 mg = 1 tab(s), Oral, Daily, # 30 tab(s), 11 Refill(s), Pharmacy: [id number]  ?  4.?Hypothyroid  ?-continue levothyroxine?prescription no change?12 month prescription given-patient was not informed by pharmacy of any type of recall for her particular lots/batch  -Check TSH today  Ordered:  levothyroxine, See Instructions, 1 tab oral daily. 2 tablets oral on Sunday., # 34 tab(s), 11 Refill(s), Pharmacy: [facility]  ?  5.?Asthma  ?-Mild persistent per patient  -Follow-up with pulmonology and continue daily?inhaled steroid prophylaxis/preventative/maintenance  ?   Problem List/Past Medical History  Ongoing  Asthma  Diabetes mellitus type 2  Hyperlipidaemia  Hypothyroid  Migraine  RA - Rheumatoid arthritis  Historical  Thyroid disease  Procedure/Surgical History  Colonoscopy (2015)  Carpal tunnel release  H/O: hysterectomy  Tonsillectomy   Medications  aspirin 81 mg oral Delayed Release (EC) tablet, 81 mg= 1 tab(s), Oral, Daily  atorvastatin 10 mg oral tablet, 10 mg= 1 tab(s), Oral, Daily, 11 refills  BREO ELLIPTA -25  Co Q-10, 100 mg, Oral, Daily  FOLIC ACID 1 MG TABS  glucosamine, Oral  levothyroxine 25 mcg (0.025 mg) oral tablet, See Instructions, 11  refills  metformin 500 mg oral tablet, 500 mg= 1 tab(s), Oral, BID, 5 refills  METHOTREXATE SODIUM 2.5 MG TABS, 15 mg= 6 tab(s), Oral, qWeek  PROAIR  (90 Base) MCG/ACT AERS  ZyrTEC, Daily  Allergies  No Known Allergies  Family History  Arthritis: Father.  Depression.: Mother.  Hypertension.: Mother.  Immunizations  Vaccine Date Status   zoster vaccine, inactivated 08/21/2018 Given   tetanus/diphtheria/pertussis, acel(Tdap) 2017 Recorded   pneumococcal 13-valent conjugate vaccine 2016 Recorded   zoster vaccine live 2014 Recorded   Health Maintenance  Health Maintenance  ???Pending?(in the next year)  ??? ??OverDue  ??? ? ? ?Breast Cancer Screening due??03/10/11??and every 2??year(s)  ??? ??Due?  ??? ? ? ?Alcohol Misuse Screening due??08/21/18??and every 1??year(s)  ??? ? ? ?Asthma Management-Asthma Education due??08/21/18??and every 6??month(s)  ??? ? ? ?Asthma Management-Spirometry due??08/21/18??Variable frequency  ??? ? ? ?Asthma Management-Allison Peak Flow due??08/21/18??Variable frequency  ??? ? ? ?Asthma Management-Written Action Plan due??08/21/18??and every 6??month(s)  ??? ? ? ?Blood Pressure Screening due??08/21/18??and every?  ??? ? ? ?Body Mass Index Check due??08/21/18??and every?  ??? ? ? ?Depression Screening due??08/21/18??and every?  ??? ? ? ?Diabetes Maintenance-Eye Exam due??08/21/18??and every?  ??? ? ? ?Diabetes Maintenance-Fasting Lipid Profile due??08/21/18??and every?  ??? ? ? ?Diabetes Maintenance-Foot Exam due??08/21/18??and every?  ??? ? ? ?Diabetes Maintenance-Microalbumin due??08/21/18??Variable frequency  ??? ? ? ?Diabetes Maintenance-Serum Creatinine due??08/21/18??Variable frequency  ??? ? ? ?Diabetes Maintenance-Urine Dipstick due??08/21/18??Variable frequency  ??? ? ? ?Influenza Vaccine due??08/21/18??and every?  ??? ? ? ?Smoking Cessation due??08/21/18??and every 180??day(s)  ??? ? ? ?Smoking Cessation (Diabetes) due??08/21/18??and every?  ??? ??Due In Future?  ??? ? ?  ?Diabetes Maintenance-HgbA1c not due until??02/20/19??and every 1??year(s)  ???Satisfied?(in the past 1 year)  ??? ??Satisfied?  ??? ? ? ?Aspirin Therapy for CVD Prevention on??08/21/18.??Satisfied by Rex Choi MD  ??? ? ? ?Asthma Management-Asthma Medication Prescribed on??08/21/18.  ??? ? ? ?Blood Pressure Screening on??08/21/18.??Satisfied by Khadijah Pate  ??? ? ? ?Body Mass Index Check on??08/21/18.??Satisfied by Khadijah Pate  ??? ? ? ?Cervical Cancer Screening on??02/20/18.??Satisfied by Phyllis Pichardo  ??? ? ? ?Diabetes Maintenance-HgbA1c on??08/21/18.??Satisfied by Rex Choi MD  ??? ? ? ?Diabetes Screening on??08/21/18.??Satisfied by Rex Choi MD  ??? ? ? ?Lipid Screening on??08/21/18.??Satisfied by Rex Choi MD  ??? ? ? ?Obesity Screening on??08/21/18.??Satisfied by Khadijah Pate  ?  ?

## 2022-05-02 NOTE — HISTORICAL [facility] CERNER
This is a historical note converted from [employer]. Formatting and pictures may have been removed.  Please reference Prema for original formatting and attached multimedia. Chief Complaint  6 mth ov  History of Present Illness  The patient is a 59yo white female.? ?Here in clinic for evaluation of Diabetes Mellitus type II.? The patient reports home blood glucose levels of 105 average?and blood pressure not checking at home.? The patient reports no hypoglycemic episodes. The patient reports 100% compliance with medication.? The patient reports no side effects with medication use.? The patient reports following a diabetic diet.? The patient reports some cardiovascular exercise implementation.? There is no chest pain or shortness of breath reported with exercise.  ?   She also reports 100% compliance with her hypothyroid medication?with no side effects. ?She also reports 100% compliance?with her statin medication and is following hyperlipidemic diet with no side effects.  ?  Review of Systems  Constitutional_no fever chills,?no unintentional weight loss  Eye_  ENMT_  Respiratory_no shortness of breath or cough  Cardiovascular_no chest pain?or shortness of breath  Gastrointestinal_  Genitourinary_  Hema/Lymph_  Endocrine_  Immunologic_  Musculoskeletal_  Integumentary_  Neurologic_  All Other ROS_negative  ?  ?  Physical Exam  Vitals & Measurements  BP:?122/78?  HT:?154.9?cm? WT:?80.4?kg? BMI:?33.51?  VITAL SIGNS:? Reviewed.? ?  GENERAL:? In?no apparent distress.? Alert and Oriented x3  CHEST:? Chest with clear breath sounds bilaterally.??No wheezes, rales, or rhonchi. Good air movement  CARDIAC:??Regular rate and rhythm.? S1 and S2,?without murmurs, gallops, or rubs.  ABDOMINAL: Normal active BS X all 4 quadrants. Nontender. Nondistended.  NEUROLOGIC EXAM:? Alert and oriented x 3.? No focal sensory or strength deficits.? ?Speech normal.? Follows commands.  MUSCULOSKELATAL: Full range of motion.? 5 out of 5 strength  throughout.  SKIN: No rash. ?No lesion.  PSYCHIATRIC:? Mood normal.  ?  FUNDOSCOPIC EXAM-no lesions/no cotton wool spots/no flare hemorrhages/no AV nicking  MONOFILAMENT TESTING: Right?foot:?No lesions/no calluses/no paresthesias/2+ dorsalis pedis-posterior tibial-popliteal. normal skin turgor.  Left?foot:?No lesions/no calluses/no paresthesias/2+ dorsalis pedis-posterior tibial-popliteal.? Normal skin turgor.  ?  Assessment/Plan  1.?Diabetes mellitus type 2?E11.9  ?  Diabetes Mellitus:?At goal per diabetic benchmarks reported by the patient; check hemoglobin A1c; no change to medication regimen; six-month prescription written-see below  1. ?Advocate 100% compliance?with medication regimen?and?diabetic diet  2. ?Advocate?increased?cardiovascular exercise as tolerated and educated upon  3.? 10% weight loss goal  4.? Monitor blood glucose daily?and keep the appropriate log for evaluation at upcoming office visits  5. ?Follow-up in 6 months for?wellness visit  6. ?Future Lab:  ?  Ordered:  metFORMIN, 500 mg = 1 tab(s), Oral, BID, # 180 tab(s), 1 Refill(s), Pharmacy: [id number]  Basic Metabolic Panel, Now collect, 03/12/19 16:08:00 CDT, Blood, Stop date 03/12/19 16:08:00 CDT, Lab Collect, Diabetes mellitus type 2, 03/12/19 16:08:00 CDT  Office/Outpatient Visit Level 4 Established [id number] PC, Diabetes mellitus type 2  Hypothyroid  Hyperlipidaemia, [facility], 03/12/19 16:02:00 CDT  ?  2.?Hypothyroid?E03.9  ?-Currently stable on regimen with no side effects  -No change to medication regimen?with enough medication called out to next wellness  -TSH at next wellness  Ordered:  Office/Outpatient Visit Level 4 Established [id number] PC, Diabetes mellitus type 2  Hypothyroid  Hyperlipidaemia, [facility] - [facility], 03/12/19 16:02:00 CDT  ?  3.?Hyperlipidaemia?E78.5  ?-Currently stable on regimen with no side effects  -No change to medication with an of medication called out to next wellness  -FLP at next  wellness  -Hyperlipidemia I discussed at length  Ordered:  Office/Outpatient Visit Level 4 Established [id number] PC, Diabetes mellitus type 2  Hypothyroid  Hyperlipidaemia, [facility] - [facility], 03/12/19 16:02:00 CDT  ?  Orders:  Clinic Follow up, *Est. 09/10/19 13:30:00 CDT, Order for future visit, Wellness examination, [facility]   Problem List/Past Medical History  Ongoing  Asthma  Diabetes mellitus type 2  Hyperlipidaemia  Hypothyroid  Migraine  RA - Rheumatoid arthritis  Historical  Thyroid disease  Procedure/Surgical History  Colonoscopy (2015)  Carpal tunnel release  H/O: hysterectomy  Tonsillectomy   Medications  aspirin 81 mg oral Delayed Release (EC) tablet, 81 mg= 1 tab(s), Oral, Daily  atorvastatin 10 mg oral tablet, 10 mg= 1 tab(s), Oral, Daily, 11 refills  BREO ELLIPTA -25  Co Q-10, 100 mg, Oral, Daily  FOLIC ACID 1 MG TABS  glucosamine, Oral  levothyroxine 25 mcg (0.025 mg) oral tablet, See Instructions, 11 refills  metformin 500 mg oral tablet, 500 mg= 1 tab(s), Oral, BID, 1 refills  METHOTREXATE SODIUM 2.5 MG TABS, 15 mg= 6 tab(s), Oral, qWeek  PROAIR  (90 Base) MCG/ACT AERS  ZyrTEC, Daily  Allergies  No Known Allergies  Social History  Tobacco  Never (less than 100 in lifetime), N/A, 03/12/2019  Family History  Arthritis: Father.  Depression.: Mother.  Hypertension.: Mother.  Immunizations  Vaccine Date Status   zoster vaccine, inactivated 12/28/2018 Given   zoster vaccine, inactivated 08/21/2018 Given   tetanus/diphtheria/pertussis, acel(Tdap) 2017 Recorded   pneumococcal 23-polyvalent vaccine 2016 Recorded   pneumococcal 13-valent conjugate vaccine 2016 Recorded   zoster vaccine live 2014 Recorded   Health Maintenance  Health Maintenance  ???Pending?(in the next year)  ??? ??OverDue  ??? ? ? ?Breast Cancer Screening due??03/10/11??and every 2??year(s)  ??? ??Due?  ??? ? ? ?ADL Screening due??03/12/19??and every 1??year(s)  ??? ? ? ?Alcohol Misuse Screening due??03/12/19??and every  1??year(s)  ??? ? ? ?Asthma Management-[place] Flow due??03/12/19??Variable frequency  ??? ? ? ?Asthma Management-Asthma Education due??03/12/19??and every 6??month(s)  ??? ? ? ?Asthma Management-Spirometry due??03/12/19??Variable frequency  ??? ? ? ?Asthma Management-Written Action Plan due??03/12/19??and every 6??month(s)  ??? ? ? ?Depression Screening due??03/12/19??and every?  ??? ? ? ?Diabetes Maintenance-Eye Exam due??03/12/19??and every?  ??? ? ? ?Diabetes Maintenance-Foot Exam due??03/12/19??and every?  ??? ? ? ?Smoking Cessation due??03/12/19??and every 180??day(s)  ??? ? ? ?Smoking Cessation (Diabetes) due??03/12/19??and every?  ??? ??Due In Future?  ??? ? ? ?Diabetes Maintenance-Fasting Lipid Profile not due until??08/21/19??and every 1??year(s)  ??? ? ? ?Aspirin Therapy for CVD Prevention not due until??08/21/19??and every 1??year(s)  ??? ? ? ?Diabetes Maintenance-Microalbumin not due until??08/21/19??and every 1??year(s)  ??? ? ? ?Diabetes Maintenance-Serum Creatinine not due until??08/21/19??and every 1??year(s)  ??? ? ? ?Blood Pressure Screening not due until??03/11/20??and every 1??year(s)  ??? ? ? ?Body Mass Index Check not due until??03/11/20??and every 1??year(s)  ??? ? ? ?Diabetes Maintenance-HgbA1c not due until??03/11/20??and every 1??year(s)  ???Satisfied?(in the past 1 year)  ??? ??Satisfied?  ??? ? ? ?Aspirin Therapy for CVD Prevention on??08/21/18.??Satisfied by Cestia MD, Rex B  ??? ? ? ?Asthma Management-Asthma Medication Prescribed on??08/21/18.  ??? ? ? ?Blood Pressure Screening on??03/12/19.??Satisfied by Khadijah Pate MA  ??? ? ? ?Body Mass Index Check on??03/12/19.??Satisfied by Khadijah Pate MA  ??? ? ? ?Diabetes Maintenance-Urine Dipstick on??03/12/19.??Satisfied by Des Collazo  ??? ? ? ?Diabetes Screening on??03/12/19.??Satisfied by Sherrie Zabala  ??? ? ? ?Influenza Vaccine on??03/12/19.??Satisfied by Khadijah Pate MA  ??? ? ? ?Lipid Screening on??08/21/18.??Satisfied by  Des Collazo  ??? ? ? ?Obesity Screening on??03/12/19.??Satisfied by Khadijah Pate MA  ?  ?

## 2022-05-02 NOTE — HISTORICAL [facility]
This is a historical note converted from [employer]. Formatting and pictures may have been removed.  Please reference [employer] for original formatting and attached multimedia. Chief Complaint  wellness  History of Present Illness  The patient is a?60 year old?female here today for a complete Wellness Physical exam. The patient has?no acute complaints today. The patient also carries a diagnosis of mentioned, which is assessed today. Exercise is reported as?moderate and includes?working out at the?gym 3- 5 times a week with weights and?45 min of cardio- however has not been as dedicated due to her daughter breaking her ankle last month and required surgery so she has been helping at her?house and with the grandkids- does plan to resume once things return to normal.?Monitors diet on a daily basis. Previous documented weight at last office visit is 150.  Also here in clinic for evaluation of T2DM. The patient reports home blood glucose levels of?admits to not monitoring. ?The patient reports no hypoglycemic episodes. The patient reports 100% compliance with medications. The patient reports no side effects with current medication use. The patient reports following a diabetic diet. The patient reports some cardiovascular exercise implementation. There is no chest pain or shortness of breath reported with activity.?  ?   Dr. Rodriguez- GYN  Dr. Tran (rheumatology)  Dr. Sahni (pulmonary)  - (Immunologist)  Dr. Rivers (ophthalmologist)  Review of Systems  Constitutional:?no weight gain,?no weight loss,?no fatigue,?no fever,?no chills,?no weakness,?no trouble sleeping.  Eyes:?no vision loss/changes,?glasses or contacts,?no pain,?no redness,?no blurry or double vision,?no flashing lights,?no specks,?no glaucoma,?no cataracts.  Last eye exam:?within last 6 months  Head:?no headache,?no head injury,?no neck pain.?  Neck:??no lumps,?no swollen glands,?no stiffness.  Ears:?no decreased hearing,?no ringing,?no earache,?no  drainage.?  Nose:?no stuffiness,?no discharge,?no itching,?no hay fever,?no nosebleeds,?no sinus pain.  Throat:?no bleeding,?no dentures,?no sore tongue,?no dry mouth,?no sore throat,?no hoarseness,?no thrush,?no non-healing sores.  Cardiovascular:?no chest pain or discomfort,?no tightness,?no palpitations,?no SOB with activity,?no difficulty breathing while supine,?no swelling,?no sudden awakening from sleep with SOB.  Vascular:?no calf pain with walking,?no leg cramping.  Respiratory:??no cough,?no sputum,?no coughing up blood,?no SOB,?no wheezing,?no painful breathing.  Gastrointestinal:?no swallowing difficulty,?no heartburn,?no change in appetite,?no nausea,?no change in bowel habits,?no rectal bleeding,?no constipation,?no diarrhea,?no yellow eyes or skin.  Urinary:?no frequency,?no urgency,?no burning or pain,?no blood in urine,?no incontinence,?no change in urinary strength.  Musculoskeletal:?no muscle or joint pain,?no stiffness,?no back pain,?no redness of joints,?no swelling of joints,?no trauma.  Skin:?no rashes,?no lumps,?no itching,?no dryness,?color normal for ethnicity,?no hair or nail changes.  Neurologic:?no dizziness,?no fainting,?no seizures,?no weakness,?no numbness,?no tingling,?no tremors.  Psychiatric:?no nervousness,?no stress,?no depression,?no memory loss.  Endocrine:?no heat or cold intolerance,?no sweating,?no frequent urination,?no thirst,?no change in appetite.  Hematologic:?no ease of bruising,?no ease of bleeding.  ?  Physical Exam  Vitals & Measurements  BP:?116/72?  HT:?154.00?cm? WT:?69.000?kg? BMI:?29.09?  General- In NAD, A&O x 4  ?   Eye- PERRL, EOMI  ?   HENT- TM/EAC clear, Nose mucosa WNL, No D/C, No Sinus Tenderness, O/P without erythema or exudates?  ?   Neck- S, No LA, No Thyromegaly, No bruits, No JVD  ?   Respiratory- CTA, No wheezing, No crackles, No rhonchi  ?   Cardiovascular- RRR W/O MGR, Pulses equal throughout  ?   Gastrointestinal- S, NT, No HSM, NABS, No masses,  No peritoneal signs?  ?   Lymphatics- WNL  ?  Musculoskeletal- No tenderness, Joints WNL, FROM, Neg SLR, No CCE  ?  Integumentary- Warm, dry, intact, No lesions/rashes/hives  ?  Neurologic- No Motor/Sensory deficits, Reflexes +2 throughout, CN II-XII intact, Neg cerebellar tests  ?  Assessment/Plan  1.?Wellness examination?Z00.00  ?1. Wellness  - Health and Exercise educated upon  -10% weight loss goal  -Lifestyle counseling > 20 minutes  -Diet: DM friendly  -Screening: UTD Colon/Mammogram/Pap/Eye exam  -Vaccines: UTD Tdap/Prevnar/Shingrix/Flu/Covid? and just had her booster  -LabS: CBC, CMP, LIPIDS, TSH, UA, MA, A1C, CK- to fax lab work to all providers  ?   2. Comorbidities- mentioned  ?   3. Referrals none at this time  ?   4. RTC in?6 month sooner if needed  Ordered:  CBC w/ Auto Diff, Routine collect, 09/23/21 8:31:00 CDT, Blood, Order for future visit, Stop date 09/23/21 8:31:00 CDT, Lab Collect, Wellness examination, 09/23/21 8:31:00 CDT  Clinic Follow up, *Est. 03/23/22 3:00:00 CDT, Order for future visit, Wellness examination, [facility]  Clinic Follow-up PRN, 09/23/21 8:30:00 CDT, [facility], Future Order  Comprehensive Metabolic Panel, Routine collect, 09/23/21 8:31:00 CDT, Blood, Order for future visit, Stop date 09/23/21 8:31:00 CDT, Lab Collect, Wellness examination  Diabetes mellitus type 2  Hyperlipidaemia, 09/23/21 8:31:00 CDT  Lab Collection Request, 09/23/21 8:31:00 CDT, [facility], 09/23/21 8:31:00 CDT, Wellness examination  Diabetes mellitus type 2  Hyperlipidaemia  Hypothyroid  Lipid Panel, Routine collect, 09/23/21 8:31:00 CDT, Blood, Order for future visit, Stop date 09/23/21 8:31:00 CDT, Lab Collect, Wellness examination  Hyperlipidaemia, 09/23/21 8:31:00 CDT  Preventative Health Care Est 40-64 years [id number] PC, Wellness examination  Diabetes mellitus type 2  Hyperlipidaemia  Asthma  Hypothyroid  RA - Rheumatoid arthritis, [facility], 09/23/21 8:30:00 CDT  Request Pap  Smear Results, 09/23/21 8:27:00 CDT, Wellness examination  Urinalysis no Reflex, Routine collect, Urine, Order for future visit, 09/23/21 8:31:00 CDT, Stop date 09/23/21 8:31:00 CDT, Nurse collect, Wellness examination  Diabetes mellitus type 2  ?  2.?Diabetes mellitus type 2?E11.9  1. Refill meds x 6 months  2. Continue diet modifications- decrease sugar, carb, starch intake, exercise as tolerated (TLC) for weight loss  3.?Encouraged importance of yearly eye exams  4. Follow up office visit 6 months for Wellness visit  Ordered:  metFORMIN, 500 mg = 1 tab(s), Oral, BID, # 180 tab(s), 1 Refill(s), [id number], 154, cm, Height/Length Dosing, 09/23/21 7:52:00 CDT, 69, kg, Weight Dosing, 09/23/21 7:52:00 CDT  Comprehensive Metabolic Panel, Routine collect, 09/23/21 8:31:00 CDT, Blood, Order for future visit, Stop date 09/23/21 8:31:00 CDT, Lab Collect, Wellness examination  Diabetes mellitus type 2  Hyperlipidaemia, 09/23/21 8:31:00 CDT  Hemoglobin A1c, Routine collect, 09/23/21 8:31:00 CDT, Blood, Order for future visit, Stop date 09/23/21 8:31:00 CDT, Lab Collect, Diabetes mellitus type 2, 09/23/21 8:31:00 CDT  Lab Collection Request, 09/23/21 8:31:00 CDT, [facility], 09/23/21 8:31:00 CDT, Wellness examination  Diabetes mellitus type 2  Hyperlipidaemia  Hypothyroid  Microalbumin Urine, Routine collect, Urine, Order for future visit, 09/23/21 8:32:00 CDT, Stop date 09/23/21 8:32:00 CDT, Nurse collect, Diabetes mellitus type 2  Preventative Health Care Est 40-64 years [id number] PC, Wellness examination  Diabetes mellitus type 2  Hyperlipidaemia  Asthma  Hypothyroid  RA - Rheumatoid arthritis, [facility] - [facility], 09/23/21 8:30:00 CDT  Urinalysis no Reflex, Routine collect, Urine, Order for future visit, 09/23/21 8:31:00 CDT, Stop date 09/23/21 8:31:00 CDT, Nurse collect, Wellness examination  Diabetes mellitus type 2  ?  3.?Hyperlipidaemia?E78.5  1. ?Refill meds x1 year  2. ?Continue diet  and exercise as tolerated  Ordered:  atorvastatin, See Instructions, TAKE 1 TABLET BY MOUTH ONCE DAILY IN THE EVENING, # 90 tab(s), 3 Refill(s), Pharmacy: [id number], 154, cm, Height/Length Dosing, 09/23/21 7:52:00 CDT, 69, kg, Weight Dosing, 09/23/21 7:52:00 CDT  Comprehensive Metabolic Panel, Routine collect, 09/23/21 8:31:00 CDT, Blood, Order for future visit, Stop date 09/23/21 8:31:00 CDT, Lab Collect, Wellness examination  Diabetes mellitus type 2  Hyperlipidaemia, 09/23/21 8:31:00 CDT  Creatine Kinase, Routine collect, 09/23/21 8:43:00 CDT, Blood, Order for future visit, Stop date 09/23/21 8:43:00 CDT, Lab Collect, Hyperlipidaemia, 09/23/21 8:43:00 CDT  Lab Collection Request, 09/23/21 8:43:00 CDT, [facility], 09/23/21 8:43:00 CDT, Hyperlipidaemia  Lab Collection Request, 09/23/21 8:31:00 CDT, [facility], 09/23/21 8:31:00 CDT, Wellness examination  Diabetes mellitus type 2  Hyperlipidaemia  Hypothyroid  Lipid Panel, Routine collect, 09/23/21 8:31:00 CDT, Blood, Order for future visit, Stop date 09/23/21 8:31:00 CDT, Lab Collect, Wellness examination  Hyperlipidaemia, 09/23/21 8:31:00 CDT  Preventative Health Care Est 40-64 years 36696 PC, Wellness examination  Diabetes mellitus type 2  Hyperlipidaemia  Asthma  Hypothyroid  RA - Rheumatoid arthritis, [facility], 09/23/21 8:30:00 CDT  ?  4.?Asthma?J45.909  1.? Chronic and controlled, currently stable and followed by Dr. Sahni  Ordered:  Preventative Health Care Est 40-64 years [id number] PC, Wellness examination  Diabetes mellitus type 2  Hyperlipidaemia  Asthma  Hypothyroid  RA - Rheumatoid arthritis, [facility] - [facility], 09/23/21 8:30:00 CDT  ?  5.?Hypothyroid?E03.9  1. ?Meds x1 year  2. ?Currently?controlled and stable  Ordered:  levothyroxine, See Instructions, TAKE 1 TABLET BY MOUTH ONCE DAILY AND 2 TABLETS ON SUNDAY., # 102 tab(s), 3 Refill(s), Pharmacy: [id number], 154, cm, Height/Length Dosing, 09/23/21  7:52:00 CDT, 69, kg, Weight Dosing, 09/23/21 7:52:00 CDT  Lab Collection Request, 09/23/21 8:31:00 CDT, [facility] - [facility], 09/23/21 8:31:00 CDT, Wellness examination  Diabetes mellitus type 2  Hyperlipidaemia  Hypothyroid  [facility] Est 40-64 years [address]85 PC, Wellness examination  Diabetes mellitus type 2  Hyperlipidaemia  Asthma  Hypothyroid  RA - Rheumatoid arthritis, [facility] - [facility], 09/23/21 8:30:00 CDT  Thyroid Stimulating Hormone, Routine collect, 09/23/21 8:31:00 CDT, Blood, Order for future visit, Stop date 09/23/21 8:31:00 CDT, Lab Collect, Hypothyroid, 09/23/21 8:31:00 CDT  ?  6.?RA - Rheumatoid arthritis?M06.9  1. ?Can stable, followed by Dr. Tran  Ordered:  [facility] Est 40-64 years [id number] PC, Wellness examination  Diabetes mellitus type 2  Hyperlipidaemia  Asthma  Hypothyroid  RA - Rheumatoid arthritis, [facility], 09/23/21 8:30:00 CDT  ?  Orders:  albuterol, See Instructions, INHALE 2 PUFFS BY MOUTH EVERY 4 HOURS, # 9 gm, 5 Refill(s), Pharmacy: [id number], 154, cm, Height/Length Dosing, 09/23/21 7:52:00 CDT, 69, kg, Weight Dosing, 09/23/21 7:52:00 CDT  levocetirizine, 5 mg = 1 tab(s), Oral, qPM, # 30 tab(s), 5 Refill(s), Pharmacy: [id number], 154, cm, Height/Length Dosing, 09/23/21 7:52:00 CDT, 69, kg, Weight Dosing, 09/23/21 7:52:00 CDT  lisinopril, 5 mg = 1 tab(s), Oral, Daily, # 90 tab(s), 3 Refill(s), Pharmacy: [id number], 154, cm, Height/Length Dosing, 09/23/21 7:52:00 CDT, 69, kg, Weight Dosing, 09/23/21 7:52:00 CDT  Referrals  Clinic Follow up, *Est. 03/23/22 3:00:00 CDT, Order for future visit, Wellness examination, [facility]  Clinic Follow-up PRN, 09/23/21 8:30:00 CDT, [facility] - [facility], Future Order   Problem List/Past Medical History  Ongoing  Asthma  Diabetes mellitus type 2  Hyperlipidaemia  Hypothyroid  Migraine  RA - Rheumatoid arthritis  Historical  Thyroid disease  Procedure/Surgical History  Colonoscopy  (2015)  Carpal tunnel release  H/O: hysterectomy  Tonsillectomy   Medications  Albuterol (Eqv-ProAir HFA) 90 mcg/inh inhalation aerosol, See Instructions, 5 refills  albuterol 2.5 mg/3 mL (0.083%) inhalation solution, 2.5 mg= 3 mL, NEB, q6hr, PRN  atorvastatin 10 mg oral tablet, See Instructions, 3 refills  BREO ELLIPTA -25  Co Q-10, 100 mg, Oral, Daily  famotidine 20 mg oral tablet, 20 mg= 1 tab(s), Oral, qPM  FOLIC ACID 1 MG TABS  glucosamine, Oral  ipratropium 42 mcg/inh (0.06%) nasal spray, 2 spray(s), Both Nostrils, TID  levothyroxine 25 mcg (0.025 mg) oral tablet, See Instructions, 3 refills  lisinopril 5 mg oral tablet, 5 mg= 1 tab(s), Oral, Daily, 3 refills  metformin 500 mg oral tablet, 500 mg= 1 tab(s), Oral, BID, 1 refills  METHOTREXATE SODIUM 2.5 MG TABS, 15 mg= 6 tab(s), Oral, qWeek  Misc Prescription  prednisONE 5 mg oral tablet, 5 mg= 1 tab(s), Oral, Daily  Xyzal 5 mg oral tablet, 5 mg= 1 tab(s), Oral, qPM, 5 refills  Allergies  No Known Allergies  Social History  Abuse/Neglect  No, 03/18/2021  No, 09/10/2019  Tobacco  Never (less than 100 in lifetime), N/A, 03/18/2021  Never (less than 100 in lifetime), N/A, 09/10/2019  Never (less than 100 in lifetime), N/A, 03/12/2019  Family History  Arthritis: Father.  Depression.: Mother.  Hypertension.: Mother.  Immunizations  Vaccine Date Status   influenza virus vaccine, inactivated 09/22/2021 Recorded   pneumococcal 23-polyvalent vaccine 08/25/2021 Recorded   COVID-19 MRNA, LNP-S, PF- Pfizer 08/16/2021 Recorded   COVID-19 MRNA, LNP-S, PF- Pfizer 03/19/2021 Recorded   COVID-19 MRNA, LNP-S, PF- Pfizer 02/26/2021 Recorded   influenza virus vaccine, inactivated 09/17/2020 Recorded   influenza virus vaccine, inactivated 09/26/2019 Recorded   zoster vaccine, inactivated 12/28/2018 Given   influenza virus vaccine, inactivated 09/14/2018 Recorded   zoster vaccine, inactivated 08/21/2018 Given   influenza virus vaccine, inactivated 08/26/2017 Recorded    pneumococcal 23-polyvalent vaccine 2017 Recorded   tetanus/diphtheria/pertussis, acel(Tdap) 2017 Recorded   pneumococcal 13-valent conjugate vaccine 10/31/2016 Recorded   influenza virus vaccine, inactivated 10/31/2016 Recorded   pneumococcal 13-valent conjugate vaccine 2016 Recorded   zoster vaccine live 2014 Recorded   Health Maintenance  Health Maintenance  ???Pending?(in the next year)  ??? ??OverDue  ??? ? ? ?Asthma Management-Asthma Medication Prescribed due??08/21/19??and every 1??year(s)  ??? ? ? ?Cervical Cancer Screening due??02/19/21??and every 3??year(s)  ??? ? ? ?Diabetes Maintenance-Fasting Lipid Profile due??09/17/21??and every 1??year(s)  ??? ??Due?  ??? ? ? ?ADL Screening due??09/23/21??and every 1??year(s)  ??? ? ? ?Aspirin Therapy for CVD Prevention due??09/23/21??and every 1??year(s)  ??? ? ? ?Asthma Management-Asthma Education due??09/23/21??and every 6??month(s)  ??? ? ? ?Asthma Management-Spirometry due??09/23/21??Variable frequency  ??? ? ? ?Asthma Management-[place] Flow due??09/23/21??Variable frequency  ??? ? ? ?Asthma Management-Written Action Plan due??09/23/21??and every 6??month(s)  ??? ??Due In Future?  ??? ? ? ?Obesity Screening not due until??01/01/22??and every 1??year(s)  ??? ? ? ?Alcohol Misuse Screening not due until??01/02/22??and every 1??year(s)  ??? ? ? ?Diabetes Maintenance-HgbA1c not due until??03/18/22??and every 1??year(s)  ??? ? ? ?Diabetes Maintenance-Serum Creatinine not due until??03/18/22??and every 1??year(s)  ??? ? ? ?Diabetes Maintenance-Eye Exam not due until??08/03/22??and every 1??year(s)  ???Satisfied?(in the past 1 year)  ??? ??Satisfied?  ??? ? ? ?Alcohol Misuse Screening on??03/18/21.??Satisfied by Christina Anderson NP  ??? ? ? ?Asthma Management-Asthma Medication Prescribed on??09/23/21.??Satisfied by Christina Anderson NP??Reason: Expectation Satisfied Elsewhere  ??? ? ? ?Blood Pressure Screening on??09/23/21.??Satisfied by [place]  Khadijah NOLEN  ??? ? ? ?Body Mass Index Check on??09/23/21.??Satisfied by Khadijah Pate MA  ??? ? ? ?Cervical Cancer Screening on??09/23/21.??Satisfied by Christina Anderson NP  ??? ? ? ?Depression Screening on??09/23/21.??Satisfied by Christina Anderson NP  ??? ? ? ?Diabetes Maintenance-Foot Exam on??09/23/21.??Satisfied by Christina Anderson NP  ??? ? ? ?Diabetes Screening on??03/18/21.??Satisfied by Des Collazo  ??? ? ? ?Influenza Vaccine on??09/22/21.??Satisfied by Khadijah Pate MA  ??? ? ? ?Obesity Screening on??09/23/21.??Satisfied by Khadijah Pate MA  ?      The?physician?is present within?the office with the?nurse practitioner.??The?office visit?documentation and management have been?reviewed and agreed upon.? I will continue to follow?this patient along with?the nurse practitioner?for current and future care.

## 2022-05-02 NOTE — HISTORICAL [facility] CERNER
This is a historical note converted from [employer]. Formatting and pictures may have been removed.  Please reference [employer] for original formatting and attached multimedia. Chief Complaint  AMP 6 mth ov  History of Present Illness  The patient is a 59 year old female. Here in clinic for evaluation of T2DM. The patient reports home blood glucose levels of admits to only monitoring them when she is taking prednisone and has not done so since January. The patient reports no hypoglycemic episodes. The patient reports 100% compliance with medications. The patient reports no side effects with current medication use. The patient reports following a diabetic diet. The patient reports no cardiovascular exercise implementation at present however has joined a gym and has an apt next week to go in for [email or web address]. There is no chest pain or shortness of breath reported with activity.?UTD with eye exam as she had last month  Review of Systems  Constitutional:?no weight gain,?weight loss,?no fatigue,?no fever,?no chills,?no weakness,?no trouble sleeping.  Eyes:?no vision loss/changes,?no glasses or contacts,?no pain,?no redness,?no blurry or double vision,?no flashing lights,?no specks,?no glaucoma,?no cataracts.  Last eye exam:?within last 6 months  Cardiovascular:?no chest pain or discomfort,?no tightness,?no palpitations,?no SOB with activity,?no difficulty breathing while supine,?no swelling,?no sudden awakening from sleep with SOB.  Respiratory:??no cough,?no sputum,?no coughing up blood,?no SOB,?no wheezing,?no painful breathing.  Neurologic:?no dizziness,?no fainting,?no seizures,?no weakness,?no numbness,?no tingling,?no tremors.  Physical Exam  Vitals & Measurements  BP:?130/76?  BMI:?31.79?  Cardiovascular- RRR W/O MGR, Pulses equal throughout  ?   Respiratory- CTA- No wheezing, No crackles, No rhonchi  ?   Monofilament-?WNL,+2 pulses,?no lesionsnoted  Assessment/Plan  1.?Diabetes mellitus type 2?E11.9  1. Refill mds x 6  months  2. Continue diet modifications- decrease sugar, carb, starch intake, exercise as tolerated (TLC) for weight loss  3.?Encouraged importance of yearly eye exams  4. Follow up office visit 6 months for Wellness visit  Ordered:  metFORMIN, 500 mg = 1 tab(s), Oral, BID, # 180 tab(s), 1 Refill(s), Pharmacy: [id number], 154, cm, Height/Length Dosing, 09/10/19 13:24:00 CDT, 77.9, kg, Weight Dosing, 09/10/19 13:24:00 CDT  Clinic Follow up, *Est. 09/10/20 3:00:00 CDT, Order for future visit, Diabetes mellitus type 2, [facility]  Comprehensive Metabolic Panel, Routine collect, 03/10/20 9:34:00 CDT, Blood, Stop date 03/10/20 9:34:00 CDT, Lab Collect, Diabetes mellitus type 2, 03/10/20 9:34:00 CDT  Hemoglobin A1c, Routine collect, 03/10/20 9:34:00 CDT, Blood, Stop date 03/10/20 9:34:00 CDT, Lab Collect, Diabetes mellitus type 2, 03/10/20 9:34:00 CDT  Office/Outpatient Visit Level 3 Established [id number] PC, Diabetes mellitus type 2, [facility], 03/10/20 9:32:00 CDT  Request Eye Exam Results, 03/10/20 9:36:00 CDT, Diabetes mellitus type 2  ?  Orders:  Clinic Follow-up PRN, 03/10/20 9:32:00 CDT, [facility], Future Order  Referrals  Clinic Follow up, *Est. 09/10/20 3:00:00 CDT, Order for future visit, Diabetes mellitus type 2, [facility]  Clinic Follow-up PRN, 03/10/20 9:32:00 CDT, [facility], Future Order   Problem List/Past Medical History  Ongoing  Asthma  Diabetes mellitus type 2  Hyperlipidaemia  Hypothyroid  Migraine  RA - Rheumatoid arthritis  Historical  Thyroid disease  Procedure/Surgical History  Colonoscopy (2015)  Carpal tunnel release  H/O: hysterectomy  Tonsillectomy   Medications  aspirin 81 mg oral Delayed Release (EC) tablet, 81 mg= 1 tab(s), Oral, Daily  atorvastatin 10 mg oral tablet, 10 mg= 1 tab(s), Oral, qPM, 3 refills  BREO ELLIPTA -25  Co Q-10, 100 mg, Oral, Daily  famotidine 20 mg oral tablet, 20 mg= 1 tab(s), Oral, qPM  fluticasone 50 mcg/inh nasal spray, 1 spray(s), Both  Nostrils, Daily  FOLIC ACID 1 MG TABS  glucosamine, Oral  levothyroxine 25 mcg (0.025 mg) oral tablet, See Instructions, 3 refills  metformin 500 mg oral tablet, 500 mg= 1 tab(s), Oral, BID, 1 refills  METHOTREXATE SODIUM 2.5 MG TABS, 15 mg= 6 tab(s), Oral, qWeek  Mucinex D, Oral, BID  omeprazole 40 mg oral DR capsule, 40 mg= 1 cap(s), Oral, BID  PROAIR  (90 Base) MCG/ACT AERS  ZyrTEC, Daily  Allergies  No Known Allergies  Social History  Abuse/Neglect  No, 09/10/2019  Tobacco  Never (less than 100 in lifetime), N/A, 09/10/2019  Never (less than 100 in lifetime), N/A, 03/12/2019  Family History  Arthritis: Father.  Depression.: Mother.  Hypertension.: Mother.  Immunizations  Vaccine Date Status   zoster vaccine, inactivated 12/28/2018 Given   zoster vaccine, inactivated 08/21/2018 Given   pneumococcal 23-polyvalent vaccine 2017 Recorded   tetanus/diphtheria/pertussis, acel(Tdap) 2017 Recorded   pneumococcal 13-valent conjugate vaccine 2016 Recorded   zoster vaccine live 2014 Recorded   Health Maintenance  Health Maintenance  ???Pending?(in the next year)  ??? ??OverDue  ??? ? ? ?Breast Cancer Screening due??03/10/11??and every 2??year(s)  ??? ? ? ?Alcohol Misuse Screening due??01/01/20??and every 1??year(s)  ??? ??Due?  ??? ? ? ?ADL Screening due??03/10/20??and every 1??year(s)  ??? ? ? ?Asthma Management-Asthma Education due??03/10/20??and every 6??month(s)  ??? ? ? ?Asthma Management-Spirometry due??03/10/20??Variable frequency  ??? ? ? ?Asthma Management-[place] Flow due??03/10/20??Variable frequency  ??? ? ? ?Asthma Management-Written Action Plan due??03/10/20??and every 6??month(s)  ??? ? ? ?Diabetes Maintenance-Eye Exam due??03/10/20??and every?  ??? ? ? ?Smoking Cessation (Diabetes) due??03/10/20??and every?  ??? ??Due In Future?  ??? ? ? ?Diabetes Maintenance-Foot Exam not due until??03/11/20??and every 1??year(s)  ??? ? ? ?Depression Screening not due until??09/09/20??and every 1??year(s)  ??? ?  ? ?Diabetes Maintenance-HgbA1c not due until??09/10/20??and every 1??year(s)  ??? ? ? ?Diabetes Maintenance-Fasting Lipid Profile not due until??09/10/20??and every 1??year(s)  ??? ? ? ?Aspirin Therapy for CVD Prevention not due until??09/10/20??and every 1??year(s)  ??? ? ? ?Diabetes Maintenance-Serum Creatinine not due until??09/11/20??and every 1??year(s)  ??? ? ? ?Diabetes Maintenance-Urine Dipstick not due until??09/11/20??and every 1??year(s)  ??? ? ? ?Diabetes Maintenance-Microalbumin not due until??09/11/20??and every 1??year(s)  ??? ? ? ?Obesity Screening not due until??01/01/21??and every 1??year(s)  ??? ? ? ?Cervical Cancer Screening not due until??02/19/21??and every 3??year(s)  ???Satisfied?(in the past 1 year)  ??? ??Satisfied?  ??? ? ? ?Aspirin Therapy for CVD Prevention on??09/10/19.??Satisfied by Rex Choi MD  ??? ? ? ?Blood Pressure Screening on??03/10/20.??Satisfied by Khadijah Pate MA  ??? ? ? ?Body Mass Index Check on??03/10/20.??Satisfied by Khadijah Pate MA  ??? ? ? ?Depression Screening on??09/10/19.??Satisfied by Rex Choi MD  ??? ? ? ?Diabetes Maintenance-Serum Creatinine on??09/11/19.??Satisfied by Sherrie Zabala  ??? ? ? ?Diabetes Screening on??09/11/19.??Satisfied by Sherrie Zabala  ??? ? ? ?Influenza Vaccine on??03/12/19.??Satisfied by Khadijah Pate MA  ??? ? ? ?Lipid Screening on??09/11/19.??Satisfied by Sherrie Zabala  ??? ? ? ?Obesity Screening on??03/10/20.??Satisfied by Khadijah Pate MA  ?      The?physician?is present within?the office with the?nurse practitioner.??The?office visit?documentation and management have been?reviewed and agreed upon.? I will continue to follow?this patient along with?the nurse practitioner?for current and future care.

## 2022-07-18 ENCOUNTER — HOSPITAL ENCOUNTER (OUTPATIENT)
Dept: RADIOLOGY | Facility: HOSPITAL | Age: 62
Discharge: HOME | End: 2022-07-18
Attending: OBSTETRICS & GYNECOLOGY
Payer: COMMERCIAL

## 2022-07-18 DIAGNOSIS — Z12.31 ENCOUNTER FOR SCREENING MAMMOGRAM FOR BREAST CANCER: ICD-10-CM

## 2022-07-18 PROCEDURE — 77063 BREAST TOMOSYNTHESIS BI: CPT | Mod: 26,,, | Performed by: RADIOLOGY

## 2022-07-18 PROCEDURE — 77063 MAMMO DIGITAL SCREENING BILAT WITH TOMO: ICD-10-PCS | Mod: 26,,, | Performed by: RADIOLOGY

## 2022-07-18 PROCEDURE — 77067 SCR MAMMO BI INCL CAD: CPT | Mod: TC

## 2022-07-18 PROCEDURE — 77067 SCR MAMMO BI INCL CAD: CPT | Mod: 26,,, | Performed by: RADIOLOGY

## 2022-07-18 PROCEDURE — 77067 MAMMO DIGITAL SCREENING BILAT WITH TOMO: ICD-10-PCS | Mod: 26,,, | Performed by: RADIOLOGY

## 2022-09-27 PROBLEM — J30.0 VASOMOTOR RHINITIS: Status: ACTIVE | Noted: 2022-09-27

## 2022-09-27 PROBLEM — M06.9 RHEUMATOID ARTHRITIS: Status: ACTIVE | Noted: 2022-09-27

## 2022-09-27 PROBLEM — J45.909 ASTHMA: Status: ACTIVE | Noted: 2022-09-27

## 2022-09-27 PROBLEM — G43.909 MIGRAINE HEADACHE: Status: ACTIVE | Noted: 2022-09-27

## 2022-09-27 PROBLEM — E11.9 TYPE 2 DIABETES MELLITUS: Status: ACTIVE | Noted: 2022-09-27

## 2022-09-27 PROBLEM — E03.9 HYPOTHYROIDISM: Status: ACTIVE | Noted: 2022-09-27

## 2022-09-27 PROBLEM — E78.5 HYPERLIPIDEMIA: Status: ACTIVE | Noted: 2022-09-27

## 2023-03-28 PROBLEM — M05.79 RHEUMATOID ARTHRITIS INVOLVING MULTIPLE SITES WITH POSITIVE RHEUMATOID FACTOR: Status: ACTIVE | Noted: 2022-09-27

## 2023-03-28 PROBLEM — J45.20 MILD INTERMITTENT ASTHMA WITHOUT COMPLICATION: Status: ACTIVE | Noted: 2022-09-27

## 2023-03-28 PROBLEM — E78.2 MIXED HYPERLIPIDEMIA: Status: ACTIVE | Noted: 2022-09-27

## 2023-03-28 PROBLEM — Z79.899 DRUG-INDUCED IMMUNODEFICIENCY: Status: ACTIVE | Noted: 2023-03-28

## 2023-03-28 PROBLEM — D84.821 DRUG-INDUCED IMMUNODEFICIENCY: Status: ACTIVE | Noted: 2023-03-28

## 2023-03-28 PROBLEM — G43.009 MIGRAINE WITHOUT AURA AND WITHOUT STATUS MIGRAINOSUS, NOT INTRACTABLE: Status: ACTIVE | Noted: 2022-09-27

## 2023-03-28 PROCEDURE — 86803 HEPATITIS C AB TEST: CPT | Performed by: FAMILY MEDICINE

## 2023-06-22 DIAGNOSIS — Z12.31 ENCOUNTER FOR SCREENING MAMMOGRAM FOR MALIGNANT NEOPLASM OF BREAST: Primary | ICD-10-CM

## 2023-07-19 ENCOUNTER — HOSPITAL ENCOUNTER (OUTPATIENT)
Dept: RADIOLOGY | Facility: HOSPITAL | Age: 63
Discharge: HOME | End: 2023-07-19
Attending: OBSTETRICS & GYNECOLOGY
Payer: COMMERCIAL

## 2023-07-19 DIAGNOSIS — Z12.31 ENCOUNTER FOR SCREENING MAMMOGRAM FOR MALIGNANT NEOPLASM OF BREAST: ICD-10-CM

## 2023-07-19 PROCEDURE — 77067 SCR MAMMO BI INCL CAD: CPT | Mod: 26,,, | Performed by: RADIOLOGY

## 2023-07-19 PROCEDURE — 77067 SCR MAMMO BI INCL CAD: CPT | Mod: TC

## 2023-07-19 PROCEDURE — 77063 MAMMO DIGITAL SCREENING BILAT WITH TOMO: ICD-10-PCS | Mod: 26,,, | Performed by: RADIOLOGY

## 2023-07-19 PROCEDURE — 77063 BREAST TOMOSYNTHESIS BI: CPT | Mod: 26,,, | Performed by: RADIOLOGY

## 2023-07-19 PROCEDURE — 77067 MAMMO DIGITAL SCREENING BILAT WITH TOMO: ICD-10-PCS | Mod: 26,,, | Performed by: RADIOLOGY

## 2023-10-06 PROCEDURE — 87389 HIV-1 AG W/HIV-1&-2 AB AG IA: CPT | Performed by: FAMILY MEDICINE

## 2023-10-13 ENCOUNTER — HOSPITAL ENCOUNTER (OUTPATIENT)
Dept: RADIOLOGY | Facility: HOSPITAL | Age: 63
Discharge: HOME | End: 2023-10-13
Attending: FAMILY MEDICINE
Payer: COMMERCIAL

## 2023-10-13 PROCEDURE — 77080 DXA BONE DENSITY AXIAL: CPT | Mod: TC

## 2023-10-13 PROCEDURE — 77080 DXA BONE DENSITY AXIAL SKELETON 1 OR MORE SITES: ICD-10-PCS | Mod: 26,,, | Performed by: RADIOLOGY

## 2023-10-13 PROCEDURE — 77080 DXA BONE DENSITY AXIAL: CPT | Mod: 26,,, | Performed by: RADIOLOGY

## 2024-07-25 ENCOUNTER — HOSPITAL ENCOUNTER (OUTPATIENT)
Dept: RADIOLOGY | Facility: HOSPITAL | Age: 64
Discharge: HOME | End: 2024-07-25
Attending: OBSTETRICS & GYNECOLOGY
Payer: COMMERCIAL

## 2024-07-25 DIAGNOSIS — Z12.31 SCREENING MAMMOGRAM FOR BREAST CANCER: ICD-10-CM

## 2024-07-25 PROCEDURE — 77067 SCR MAMMO BI INCL CAD: CPT | Mod: TC

## 2025-07-29 ENCOUNTER — HOSPITAL ENCOUNTER (OUTPATIENT)
Dept: RADIOLOGY | Facility: HOSPITAL | Age: 65
Discharge: HOME | End: 2025-07-29
Attending: OBSTETRICS & GYNECOLOGY
Payer: COMMERCIAL

## 2025-07-29 DIAGNOSIS — Z12.31 ENCOUNTER FOR SCREENING MAMMOGRAM FOR BREAST CANCER: ICD-10-CM

## 2025-07-29 PROCEDURE — 77067 SCR MAMMO BI INCL CAD: CPT | Mod: TC

## 2025-07-29 PROCEDURE — 77063 BREAST TOMOSYNTHESIS BI: CPT | Mod: 26,,, | Performed by: STUDENT IN AN ORGANIZED HEALTH CARE EDUCATION/TRAINING PROGRAM

## 2025-07-29 PROCEDURE — 77067 SCR MAMMO BI INCL CAD: CPT | Mod: 26,,, | Performed by: STUDENT IN AN ORGANIZED HEALTH CARE EDUCATION/TRAINING PROGRAM
